# Patient Record
Sex: FEMALE | Race: WHITE | NOT HISPANIC OR LATINO
[De-identification: names, ages, dates, MRNs, and addresses within clinical notes are randomized per-mention and may not be internally consistent; named-entity substitution may affect disease eponyms.]

---

## 2017-02-08 ENCOUNTER — APPOINTMENT (OUTPATIENT)
Dept: OTOLARYNGOLOGY | Facility: CLINIC | Age: 82
End: 2017-02-08

## 2017-04-26 ENCOUNTER — APPOINTMENT (OUTPATIENT)
Dept: OTOLARYNGOLOGY | Facility: CLINIC | Age: 82
End: 2017-04-26

## 2017-04-26 VITALS
HEART RATE: 55 BPM | BODY MASS INDEX: 22.16 KG/M2 | HEIGHT: 65 IN | DIASTOLIC BLOOD PRESSURE: 84 MMHG | SYSTOLIC BLOOD PRESSURE: 157 MMHG | WEIGHT: 133 LBS

## 2017-04-26 RX ORDER — OMEPRAZOLE 40 MG/1
40 CAPSULE, DELAYED RELEASE ORAL
Qty: 90 | Refills: 0 | Status: ACTIVE | COMMUNITY
Start: 2017-02-25

## 2017-04-26 RX ORDER — ATENOLOL 50 MG/1
50 TABLET ORAL
Qty: 90 | Refills: 0 | Status: ACTIVE | COMMUNITY
Start: 2017-01-17

## 2017-04-26 RX ORDER — BUSPIRONE HYDROCHLORIDE 5 MG/1
5 TABLET ORAL
Qty: 30 | Refills: 0 | Status: ACTIVE | COMMUNITY
Start: 2017-03-01

## 2017-04-26 RX ORDER — ATORVASTATIN CALCIUM 10 MG/1
10 TABLET, FILM COATED ORAL
Qty: 90 | Refills: 0 | Status: ACTIVE | COMMUNITY
Start: 2016-05-12

## 2017-04-26 RX ORDER — AMLODIPINE BESYLATE 5 MG/1
5 TABLET ORAL
Qty: 90 | Refills: 0 | Status: ACTIVE | COMMUNITY
Start: 2016-12-07

## 2017-04-26 RX ORDER — LOSARTAN POTASSIUM AND HYDROCHLOROTHIAZIDE 12.5; 5 MG/1; MG/1
50-12.5 TABLET ORAL
Qty: 90 | Refills: 0 | Status: ACTIVE | COMMUNITY
Start: 2016-12-07

## 2017-07-03 ENCOUNTER — TRANSCRIPTION ENCOUNTER (OUTPATIENT)
Age: 82
End: 2017-07-03

## 2017-07-19 ENCOUNTER — APPOINTMENT (OUTPATIENT)
Dept: OTOLARYNGOLOGY | Facility: CLINIC | Age: 82
End: 2017-07-19

## 2017-11-17 ENCOUNTER — APPOINTMENT (OUTPATIENT)
Dept: OTOLARYNGOLOGY | Facility: CLINIC | Age: 82
End: 2017-11-17
Payer: MEDICARE

## 2017-11-17 VITALS
BODY MASS INDEX: 22.2 KG/M2 | SYSTOLIC BLOOD PRESSURE: 157 MMHG | DIASTOLIC BLOOD PRESSURE: 90 MMHG | HEART RATE: 58 BPM | HEIGHT: 64 IN | WEIGHT: 130 LBS

## 2017-11-17 PROCEDURE — 69210 REMOVE IMPACTED EAR WAX UNI: CPT

## 2017-11-17 PROCEDURE — 99213 OFFICE O/P EST LOW 20 MIN: CPT | Mod: 25

## 2017-11-17 RX ORDER — AMOXICILLIN 250 MG/1
250 CAPSULE ORAL
Qty: 14 | Refills: 0 | Status: ACTIVE | COMMUNITY
Start: 2017-11-06

## 2017-11-17 RX ORDER — HYDROCHLOROTHIAZIDE 12.5 MG/1
12.5 CAPSULE ORAL
Qty: 30 | Refills: 0 | Status: ACTIVE | COMMUNITY
Start: 2017-10-25

## 2018-03-19 ENCOUNTER — INPATIENT (INPATIENT)
Facility: HOSPITAL | Age: 83
LOS: 2 days | Discharge: HOME HEALTH | End: 2018-03-22
Attending: STUDENT IN AN ORGANIZED HEALTH CARE EDUCATION/TRAINING PROGRAM | Admitting: STUDENT IN AN ORGANIZED HEALTH CARE EDUCATION/TRAINING PROGRAM
Payer: MEDICARE

## 2018-03-19 VITALS
OXYGEN SATURATION: 98 % | TEMPERATURE: 98 F | HEART RATE: 145 BPM | SYSTOLIC BLOOD PRESSURE: 144 MMHG | RESPIRATION RATE: 20 BRPM | DIASTOLIC BLOOD PRESSURE: 100 MMHG

## 2018-03-19 DIAGNOSIS — S72.001E: Chronic | ICD-10-CM

## 2018-03-19 DIAGNOSIS — I48.91 UNSPECIFIED ATRIAL FIBRILLATION: ICD-10-CM

## 2018-03-19 LAB
ALBUMIN SERPL ELPH-MCNC: 3.7 G/DL — SIGNIFICANT CHANGE UP (ref 3.3–5)
ALP SERPL-CCNC: 84 U/L — SIGNIFICANT CHANGE UP (ref 40–120)
ALT FLD-CCNC: 7 U/L — SIGNIFICANT CHANGE UP (ref 4–33)
APTT BLD: 30.7 SEC — SIGNIFICANT CHANGE UP (ref 27.5–37.4)
AST SERPL-CCNC: 14 U/L — SIGNIFICANT CHANGE UP (ref 4–32)
BASOPHILS # BLD AUTO: 0.03 K/UL — SIGNIFICANT CHANGE UP (ref 0–0.2)
BASOPHILS NFR BLD AUTO: 0.3 % — SIGNIFICANT CHANGE UP (ref 0–2)
BILIRUB SERPL-MCNC: 0.6 MG/DL — SIGNIFICANT CHANGE UP (ref 0.2–1.2)
BUN SERPL-MCNC: 23 MG/DL — SIGNIFICANT CHANGE UP (ref 7–23)
CALCIUM SERPL-MCNC: 9 MG/DL — SIGNIFICANT CHANGE UP (ref 8.4–10.5)
CHLORIDE SERPL-SCNC: 95 MMOL/L — LOW (ref 98–107)
CK MB BLD-MCNC: 2.54 NG/ML — SIGNIFICANT CHANGE UP (ref 1–4.7)
CK MB BLD-MCNC: SIGNIFICANT CHANGE UP (ref 0–2.5)
CK SERPL-CCNC: 43 U/L — SIGNIFICANT CHANGE UP (ref 25–170)
CO2 SERPL-SCNC: 22 MMOL/L — SIGNIFICANT CHANGE UP (ref 22–31)
CREAT SERPL-MCNC: 1.55 MG/DL — HIGH (ref 0.5–1.3)
EOSINOPHIL # BLD AUTO: 0.05 K/UL — SIGNIFICANT CHANGE UP (ref 0–0.5)
EOSINOPHIL NFR BLD AUTO: 0.5 % — SIGNIFICANT CHANGE UP (ref 0–6)
GLUCOSE SERPL-MCNC: 161 MG/DL — HIGH (ref 70–99)
HCT VFR BLD CALC: 36.9 % — SIGNIFICANT CHANGE UP (ref 34.5–45)
HGB BLD-MCNC: 12.4 G/DL — SIGNIFICANT CHANGE UP (ref 11.5–15.5)
IMM GRANULOCYTES # BLD AUTO: 0.05 # — SIGNIFICANT CHANGE UP
IMM GRANULOCYTES NFR BLD AUTO: 0.5 % — SIGNIFICANT CHANGE UP (ref 0–1.5)
INR BLD: 1.21 — HIGH (ref 0.88–1.17)
LYMPHOCYTES # BLD AUTO: 1.48 K/UL — SIGNIFICANT CHANGE UP (ref 1–3.3)
LYMPHOCYTES # BLD AUTO: 14.4 % — SIGNIFICANT CHANGE UP (ref 13–44)
MCHC RBC-ENTMCNC: 30.4 PG — SIGNIFICANT CHANGE UP (ref 27–34)
MCHC RBC-ENTMCNC: 33.6 % — SIGNIFICANT CHANGE UP (ref 32–36)
MCV RBC AUTO: 90.4 FL — SIGNIFICANT CHANGE UP (ref 80–100)
MONOCYTES # BLD AUTO: 0.88 K/UL — SIGNIFICANT CHANGE UP (ref 0–0.9)
MONOCYTES NFR BLD AUTO: 8.6 % — SIGNIFICANT CHANGE UP (ref 2–14)
NEUTROPHILS # BLD AUTO: 7.8 K/UL — HIGH (ref 1.8–7.4)
NEUTROPHILS NFR BLD AUTO: 75.7 % — SIGNIFICANT CHANGE UP (ref 43–77)
NRBC # FLD: 0 — SIGNIFICANT CHANGE UP
PLATELET # BLD AUTO: 338 K/UL — SIGNIFICANT CHANGE UP (ref 150–400)
PMV BLD: 10.1 FL — SIGNIFICANT CHANGE UP (ref 7–13)
POTASSIUM SERPL-MCNC: 4 MMOL/L — SIGNIFICANT CHANGE UP (ref 3.5–5.3)
POTASSIUM SERPL-SCNC: 4 MMOL/L — SIGNIFICANT CHANGE UP (ref 3.5–5.3)
PROT SERPL-MCNC: 7.1 G/DL — SIGNIFICANT CHANGE UP (ref 6–8.3)
PROTHROM AB SERPL-ACNC: 14 SEC — HIGH (ref 9.8–13.1)
RBC # BLD: 4.08 M/UL — SIGNIFICANT CHANGE UP (ref 3.8–5.2)
RBC # FLD: 12.8 % — SIGNIFICANT CHANGE UP (ref 10.3–14.5)
SODIUM SERPL-SCNC: 136 MMOL/L — SIGNIFICANT CHANGE UP (ref 135–145)
TROPONIN T SERPL-MCNC: < 0.06 NG/ML — SIGNIFICANT CHANGE UP (ref 0–0.06)
TSH SERPL-MCNC: 2.73 UIU/ML — SIGNIFICANT CHANGE UP (ref 0.27–4.2)
WBC # BLD: 10.29 K/UL — SIGNIFICANT CHANGE UP (ref 3.8–10.5)
WBC # FLD AUTO: 10.29 K/UL — SIGNIFICANT CHANGE UP (ref 3.8–10.5)

## 2018-03-19 PROCEDURE — 71045 X-RAY EXAM CHEST 1 VIEW: CPT | Mod: 26

## 2018-03-19 RX ORDER — SODIUM CHLORIDE 9 MG/ML
500 INJECTION INTRAMUSCULAR; INTRAVENOUS; SUBCUTANEOUS ONCE
Qty: 0 | Refills: 0 | Status: COMPLETED | OUTPATIENT
Start: 2018-03-19 | End: 2018-03-19

## 2018-03-19 RX ORDER — METOPROLOL TARTRATE 50 MG
50 TABLET ORAL
Qty: 0 | Refills: 0 | Status: DISCONTINUED | OUTPATIENT
Start: 2018-03-19 | End: 2018-03-19

## 2018-03-19 RX ORDER — HEPARIN SODIUM 5000 [USP'U]/ML
4500 INJECTION INTRAVENOUS; SUBCUTANEOUS EVERY 6 HOURS
Qty: 0 | Refills: 0 | Status: DISCONTINUED | OUTPATIENT
Start: 2018-03-19 | End: 2018-03-21

## 2018-03-19 RX ORDER — HEPARIN SODIUM 5000 [USP'U]/ML
INJECTION INTRAVENOUS; SUBCUTANEOUS
Qty: 25000 | Refills: 0 | Status: DISCONTINUED | OUTPATIENT
Start: 2018-03-19 | End: 2018-03-21

## 2018-03-19 RX ORDER — METOPROLOL TARTRATE 50 MG
12.5 TABLET ORAL ONCE
Qty: 0 | Refills: 0 | Status: COMPLETED | OUTPATIENT
Start: 2018-03-19 | End: 2018-03-19

## 2018-03-19 RX ORDER — HEPARIN SODIUM 5000 [USP'U]/ML
4500 INJECTION INTRAVENOUS; SUBCUTANEOUS ONCE
Qty: 0 | Refills: 0 | Status: COMPLETED | OUTPATIENT
Start: 2018-03-19 | End: 2018-03-19

## 2018-03-19 RX ORDER — HEPARIN SODIUM 5000 [USP'U]/ML
2000 INJECTION INTRAVENOUS; SUBCUTANEOUS EVERY 6 HOURS
Qty: 0 | Refills: 0 | Status: DISCONTINUED | OUTPATIENT
Start: 2018-03-19 | End: 2018-03-21

## 2018-03-19 RX ADMIN — SODIUM CHLORIDE 1000 MILLILITER(S): 9 INJECTION INTRAMUSCULAR; INTRAVENOUS; SUBCUTANEOUS at 23:40

## 2018-03-19 RX ADMIN — HEPARIN SODIUM 1100 UNIT(S)/HR: 5000 INJECTION INTRAVENOUS; SUBCUTANEOUS at 23:02

## 2018-03-19 RX ADMIN — Medication 12.5 MILLIGRAM(S): at 23:40

## 2018-03-19 RX ADMIN — HEPARIN SODIUM 4500 UNIT(S): 5000 INJECTION INTRAVENOUS; SUBCUTANEOUS at 23:02

## 2018-03-19 NOTE — ED ADULT NURSE REASSESSMENT NOTE - NS ED NURSE REASSESS COMMENT FT1
MD attending aware of patients blood pressure, 250 ml bolus ordered. pt reports "feeling better", in NAD, will re-assess after bolus.

## 2018-03-19 NOTE — ED PROVIDER NOTE - MEDICAL DECISION MAKING DETAILS
89 F with new onset afib. Ordered standard labs, no signs of ACS or thyroid cause. Will admit for AC and rate control.

## 2018-03-19 NOTE — ED PROVIDER NOTE - ATTENDING CONTRIBUTION TO CARE
nic: one week of shortness of breath on ambulation and weakness. seen today at PCP and found to be in rapid atrial fibrillation and sent to ED.   exam: HR approx 110. NAD, although pt appeared fatigued at rest.  lungs clear. exam otherwise unremarkable.   ekg: JESS rate 145.   labs and cxr pending.  WIll give small dose cardiazem to slow HR. awaiting w/u. will admit to hospital.

## 2018-03-19 NOTE — ED PROVIDER NOTE - PMH
Essential hypertension    Hyperlipidemia    Rib fracture, left, closed, initial encounter  in sept of 2015, recovering.

## 2018-03-19 NOTE — ED PROVIDER NOTE - PSH
Open right hip fracture, type I or II, with routine healing, subsequent encounter  arthroplast in the past

## 2018-03-19 NOTE — ED ADULT TRIAGE NOTE - CHIEF COMPLAINT QUOTE
presented to walk in center feeling light headed,. was found to by hypotensive and in rapid a-fib in the 140s. pt is unsure if she has a hx. pt in NAD at triage denies CP or SOB.

## 2018-03-19 NOTE — ED PROVIDER NOTE - OBJECTIVE STATEMENT
89 yo F PMH of HTN, HLD, GERD presenting with rapid heart rate from PCP. Pt states that this all began about 2 weeks ago when she saw a cardiologist (Dr. Steven Goldberg). She states at that time was found to have a low BP (110/70) and he d/c'd her HCTZ and decreased her dose of atenolol. Pt subsequently began having worsening SOB and KAPLAN. Pt 89 yo F PMH of HTN, HLD, GERD presenting with rapid heart rate from PCP. Pt states that this all began about 2 weeks ago when she saw a cardiologist (Dr. Steven Goldberg). She states at that time was found to have a low BP (110/70) and he d/c'd her HCTZ and decreased her dose of atenolol. Pt subsequently began having worsening SOB and KAPLAN. Pt went in to [facility] and was found to have rapid afib and sent to the ED. Pt does not have a history of Afib per pt and per cardiologist. Pt denies any CP, N/V, diarrhea, recent infection.

## 2018-03-19 NOTE — ED ADULT NURSE NOTE - OBJECTIVE STATEMENT
Pt A+OX3 c/o SOB x2 days.  Went to [facility] today to get checked for SOB and was sent here.  Says her MD recently decreased her BP and water pill.  No pedal edema noted.  EKG done.  CM placed.  Labs obtained and sent as ordered.  PIV placed

## 2018-03-19 NOTE — ED PROVIDER NOTE - PROGRESS NOTE DETAILS
Spoke with on call cardiologist Dr. Kebede who [facility]. Spoke with Dr. Jacob and will be admitted under him

## 2018-03-20 DIAGNOSIS — R93.8 ABNORMAL FINDINGS ON DIAGNOSTIC IMAGING OF OTHER SPECIFIED BODY STRUCTURES: ICD-10-CM

## 2018-03-20 DIAGNOSIS — I48.91 UNSPECIFIED ATRIAL FIBRILLATION: ICD-10-CM

## 2018-03-20 DIAGNOSIS — Z29.9 ENCOUNTER FOR PROPHYLACTIC MEASURES, UNSPECIFIED: ICD-10-CM

## 2018-03-20 DIAGNOSIS — I10 ESSENTIAL (PRIMARY) HYPERTENSION: ICD-10-CM

## 2018-03-20 DIAGNOSIS — E78.5 HYPERLIPIDEMIA, UNSPECIFIED: ICD-10-CM

## 2018-03-20 LAB
APTT BLD: 162.4 SEC — CRITICAL HIGH (ref 27.5–37.4)
APTT BLD: 32 SEC — SIGNIFICANT CHANGE UP (ref 27.5–37.4)
APTT BLD: 68.7 SEC — HIGH (ref 27.5–37.4)
BUN SERPL-MCNC: 24 MG/DL — HIGH (ref 7–23)
CALCIUM SERPL-MCNC: 8.1 MG/DL — LOW (ref 8.4–10.5)
CHLORIDE SERPL-SCNC: 101 MMOL/L — SIGNIFICANT CHANGE UP (ref 98–107)
CHOLEST SERPL-MCNC: 137 MG/DL — SIGNIFICANT CHANGE UP (ref 120–199)
CK MB BLD-MCNC: 3.22 NG/ML — SIGNIFICANT CHANGE UP (ref 1–4.7)
CK SERPL-CCNC: 31 U/L — SIGNIFICANT CHANGE UP (ref 25–170)
CO2 SERPL-SCNC: 25 MMOL/L — SIGNIFICANT CHANGE UP (ref 22–31)
CREAT SERPL-MCNC: 1.36 MG/DL — HIGH (ref 0.5–1.3)
GLUCOSE SERPL-MCNC: 109 MG/DL — HIGH (ref 70–99)
HBA1C BLD-MCNC: 6 % — HIGH (ref 4–5.6)
HCT VFR BLD CALC: 30 % — LOW (ref 34.5–45)
HDLC SERPL-MCNC: 44 MG/DL — LOW (ref 45–65)
HGB BLD-MCNC: 10.2 G/DL — LOW (ref 11.5–15.5)
LIPID PNL WITH DIRECT LDL SERPL: 85 MG/DL — SIGNIFICANT CHANGE UP
MAGNESIUM SERPL-MCNC: 1 MG/DL — CRITICAL LOW (ref 1.6–2.6)
MCHC RBC-ENTMCNC: 30.6 PG — SIGNIFICANT CHANGE UP (ref 27–34)
MCHC RBC-ENTMCNC: 34 % — SIGNIFICANT CHANGE UP (ref 32–36)
MCV RBC AUTO: 90.1 FL — SIGNIFICANT CHANGE UP (ref 80–100)
NRBC # FLD: 0 — SIGNIFICANT CHANGE UP
PLATELET # BLD AUTO: 245 K/UL — SIGNIFICANT CHANGE UP (ref 150–400)
PMV BLD: 10 FL — SIGNIFICANT CHANGE UP (ref 7–13)
POTASSIUM SERPL-MCNC: 3.8 MMOL/L — SIGNIFICANT CHANGE UP (ref 3.5–5.3)
POTASSIUM SERPL-SCNC: 3.8 MMOL/L — SIGNIFICANT CHANGE UP (ref 3.5–5.3)
RBC # BLD: 3.33 M/UL — LOW (ref 3.8–5.2)
RBC # FLD: 12.8 % — SIGNIFICANT CHANGE UP (ref 10.3–14.5)
SODIUM SERPL-SCNC: 138 MMOL/L — SIGNIFICANT CHANGE UP (ref 135–145)
TRIGL SERPL-MCNC: 60 MG/DL — SIGNIFICANT CHANGE UP (ref 10–149)
TROPONIN T SERPL-MCNC: < 0.06 NG/ML — SIGNIFICANT CHANGE UP (ref 0–0.06)
WBC # BLD: 6.43 K/UL — SIGNIFICANT CHANGE UP (ref 3.8–10.5)
WBC # FLD AUTO: 6.43 K/UL — SIGNIFICANT CHANGE UP (ref 3.8–10.5)

## 2018-03-20 RX ORDER — PANTOPRAZOLE SODIUM 20 MG/1
40 TABLET, DELAYED RELEASE ORAL
Qty: 0 | Refills: 0 | Status: DISCONTINUED | OUTPATIENT
Start: 2018-03-20 | End: 2018-03-22

## 2018-03-20 RX ORDER — ATORVASTATIN CALCIUM 80 MG/1
10 TABLET, FILM COATED ORAL AT BEDTIME
Qty: 0 | Refills: 0 | Status: DISCONTINUED | OUTPATIENT
Start: 2018-03-20 | End: 2018-03-22

## 2018-03-20 RX ORDER — MAGNESIUM SULFATE 500 MG/ML
2 VIAL (ML) INJECTION ONCE
Qty: 0 | Refills: 0 | Status: COMPLETED | OUTPATIENT
Start: 2018-03-20 | End: 2018-03-20

## 2018-03-20 RX ORDER — MAGNESIUM OXIDE 400 MG ORAL TABLET 241.3 MG
400 TABLET ORAL
Qty: 0 | Refills: 0 | Status: COMPLETED | OUTPATIENT
Start: 2018-03-20 | End: 2018-03-20

## 2018-03-20 RX ORDER — CHOLECALCIFEROL (VITAMIN D3) 125 MCG
2000 CAPSULE ORAL DAILY
Qty: 0 | Refills: 0 | Status: DISCONTINUED | OUTPATIENT
Start: 2018-03-20 | End: 2018-03-22

## 2018-03-20 RX ORDER — ATENOLOL 25 MG/1
25 TABLET ORAL DAILY
Qty: 0 | Refills: 0 | Status: DISCONTINUED | OUTPATIENT
Start: 2018-03-20 | End: 2018-03-21

## 2018-03-20 RX ORDER — MAGNESIUM SULFATE 500 MG/ML
2 VIAL (ML) INJECTION ONCE
Qty: 0 | Refills: 0 | Status: DISCONTINUED | OUTPATIENT
Start: 2018-03-20 | End: 2018-03-20

## 2018-03-20 RX ADMIN — MAGNESIUM OXIDE 400 MG ORAL TABLET 400 MILLIGRAM(S): 241.3 TABLET ORAL at 08:51

## 2018-03-20 RX ADMIN — ATENOLOL 25 MILLIGRAM(S): 25 TABLET ORAL at 06:21

## 2018-03-20 RX ADMIN — Medication 2000 UNIT(S): at 13:22

## 2018-03-20 RX ADMIN — Medication 50 GRAM(S): at 11:50

## 2018-03-20 RX ADMIN — HEPARIN SODIUM 4500 UNIT(S): 5000 INJECTION INTRAVENOUS; SUBCUTANEOUS at 18:50

## 2018-03-20 RX ADMIN — HEPARIN SODIUM 0 UNIT(S)/HR: 5000 INJECTION INTRAVENOUS; SUBCUTANEOUS at 06:56

## 2018-03-20 RX ADMIN — Medication 50 GRAM(S): at 06:20

## 2018-03-20 RX ADMIN — HEPARIN SODIUM 900 UNIT(S)/HR: 5000 INJECTION INTRAVENOUS; SUBCUTANEOUS at 13:11

## 2018-03-20 RX ADMIN — HEPARIN SODIUM 1100 UNIT(S)/HR: 5000 INJECTION INTRAVENOUS; SUBCUTANEOUS at 18:45

## 2018-03-20 RX ADMIN — MAGNESIUM OXIDE 400 MG ORAL TABLET 400 MILLIGRAM(S): 241.3 TABLET ORAL at 17:43

## 2018-03-20 RX ADMIN — PANTOPRAZOLE SODIUM 40 MILLIGRAM(S): 20 TABLET, DELAYED RELEASE ORAL at 08:50

## 2018-03-20 RX ADMIN — ATORVASTATIN CALCIUM 10 MILLIGRAM(S): 80 TABLET, FILM COATED ORAL at 21:21

## 2018-03-20 RX ADMIN — MAGNESIUM OXIDE 400 MG ORAL TABLET 400 MILLIGRAM(S): 241.3 TABLET ORAL at 13:21

## 2018-03-20 RX ADMIN — HEPARIN SODIUM 900 UNIT(S)/HR: 5000 INJECTION INTRAVENOUS; SUBCUTANEOUS at 07:29

## 2018-03-20 NOTE — PATIENT PROFILE ADULT. - EXTENSIONS OF SELF_ADULT
Cane/Dentures/Eyeglasses/pt wears glasses at home but did not bring to hospital, cane at bedside/Hearing Aid

## 2018-03-20 NOTE — H&P ADULT - PROBLEM SELECTOR PLAN 2
Prelim CXR revealed "Elevated left hemidiaphragm with rightward shift of the heart. Clear lungs" Will wait for official CXR read in am. Patient currently vitally stable   Will monitor for now   Continuous pulse oxygen, and NC at 2L

## 2018-03-20 NOTE — H&P ADULT - NEGATIVE NEUROLOGICAL SYMPTOMS
no headache/no confusion/no focal seizures/no syncope/no difficulty walking/no generalized seizures/no vertigo/no transient paralysis/no weakness/no paresthesias/no tremors/no loss of consciousness/no hemiparesis/no loss of sensation

## 2018-03-20 NOTE — ED ADULT NURSE REASSESSMENT NOTE - NS ED NURSE REASSESS COMMENT FT1
tele pa made aware of labs notification that patients mg level was 1.0. Cynthia Mcgregor called from lab with result. will medicate as per ordered.

## 2018-03-20 NOTE — H&P ADULT - PROBLEM SELECTOR PLAN 1
[id number] Score of 4 and patient started on heparin drip for anticoagulation  Will monitor on telemetry, serial EKG and Britney prn for episodes of palpitations or chest pain  HgbA1C, lipid profile, CBC, CMP in am  TTE ordered to evaluate LVEF in am   Started on Cardizem 30mg q6hrs for rate control with hold parameters   Will continue with Atenolol 25mg QD for rate control with hold parameters

## 2018-03-20 NOTE — H&P ADULT - NEGATIVE ENMT SYMPTOMS
no ear pain/no tinnitus/no vertigo/no sinus symptoms/no nasal discharge/no hearing difficulty/no nasal congestion

## 2018-03-20 NOTE — H&P ADULT - ATTENDING COMMENTS
Chart reviewed. Vitals and Labs noted.   Pt seen and examined at bedside. Plan formulated with the resident/PA/NP. Detail H&P as above.   Feeling anxious, feel well otherwise.   no cp, no sob, no n/v/d. no abdominal pain.  no headache, no dizziness. no recent illness.     Admitted for new onset a.fib. Card eval appreciated.   Cardizem for rate control.  Heparin drip. enzymes neg. TSH WNL.   pending TTE.  aggressively supplement electrolytes. Repeat K and Mg.   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 monitor.     Dr. Steele will be covering me starting 3/21/18. Please paged her at  if needed.     - Dr. RICHI Adams ([facility])  - [phone]

## 2018-03-20 NOTE — PATIENT PROFILE ADULT. - NS TRANSFER PATIENT BELONGINGS
watch on patient, jewelry and cell phone in purse, purse present/Other belongings/Clothing/Cell Phone/PDA (specify)/Wrist Watch/Jewelry/Money (specify)

## 2018-03-20 NOTE — H&P ADULT - NEGATIVE MUSCULOSKELETAL SYMPTOMS
no muscle weakness/no neck pain/no joint swelling/no myalgia/no stiffness/no arthralgia/no arthritis/no muscle cramps

## 2018-03-20 NOTE — PATIENT PROFILE ADULT. - PRO MENTAL HEALTH SX RECENT
poor appetite/feeling depressed/feeling down/bouts of crying/little interest or pleasure in doing things/change in sleep pattern

## 2018-03-20 NOTE — H&P ADULT - ASSESSMENT
89 y/o F with PMH of HLD, HTN presented from PCP office for elevated heart rate.    +New onset Atrial fibrillation with RVR-On Heparin gtt

## 2018-03-20 NOTE — H&P ADULT - RS GEN PE MLT RESP DETAILS PC
clear to auscultation bilaterally/no wheezes/breath sounds equal/no intercostal retractions/no rhonchi/no chest wall tenderness/normal/no rales/good air movement/respirations non-labored/airway patent

## 2018-03-20 NOTE — CONSULT NOTE ADULT - SUBJECTIVE AND OBJECTIVE BOX
CHIEF COMPLAINT: SOB    HISTORY OF PRESENT ILLNESS:  This is a 89 year old woman with HLD and HTN who presented from her physician's office for elevated heart rates. Ms. Meier says she has been feeling SOB with minimal exertion and tired for the past few days. She went to her PCP for these symptoms and was told to stop her HCTZ and cut the atenolol dose in half as her blood pressure was 110/70. This did not relieve her symptoms so she went in to [facility] today and was found to be in rapid atrial fibrillation and was sent to the ED. Patient denied any CP, palpitations, fevers, chills, N/V/D/C, abdominal pain, dysuria, melena, hematochezia, recent travel, sick contact, pleuritic or positional chest pain.     On ED admission EKG revealed Atrial fibrillation with RVR at a rate of 145 with 1mm STD in leads V2-V4 and QTc of 466, CE x 1: Negative, BUN/Cr: 23/1.55, Gluc: 161. Prelim CXR: Elevated left hemidiaphragm with rightward shift of the heart. Clear lungs. Patient received 1x dose of IV Cardizem and IV Metoprolol in the ED. When examined patient is resting in the stretcher and denied any current complaints.    Currently patient is better rate controlled and feels better.       Allergies  No Known Allergies    Advil (Stomach Upset)  	    MEDICATIONS:  ATENolol  Tablet 25 milliGRAM(s) Oral daily  diltiazem    Tablet 30 milliGRAM(s) Oral every 6 hours  heparin  Infusion.  Unit(s)/Hr IV Continuous <Continuous>  heparin  Injectable 4500 Unit(s) IV Push every 6 hours PRN  heparin  Injectable 2000 Unit(s) IV Push every 6 hours PRN  pantoprazole    Tablet 40 milliGRAM(s) Oral before breakfast  atorvastatin 10 milliGRAM(s) Oral at bedtime    cholecalciferol 2000 Unit(s) Oral daily      PAST MEDICAL & SURGICAL HISTORY:  Hyperlipidemia  Rib fracture, left, closed, initial encounter: in sept of 2015, recovering.  Essential hypertension  Open right hip fracture, type I or II, with routine healing, subsequent encounter: arthroplast in the past      FAMILY HISTORY:  HTN      SOCIAL HISTORY:    Non-smoker        REVIEW OF SYSTEMS:  See HPI, otherwise complete 10 point review of systems negative      PHYSICAL EXAM:  T(C): 36.6 (03-19-18 @ 19:41), Max: 36.6 (03-19-18 @ 19:41)  HR: 66 (03-20-18 @ 07:22) (66 - 154)  BP: 136/70 (03-20-18 @ 07:22) (80/58 - 187/89)  RR: 20 (03-20-18 @ 07:22) (20 - 20)  SpO2: 98% (03-20-18 @ 07:22) (96% - 99%)  Wt(kg): --  I&O's Summary      Appearance: No Acute Distress	  HEENT:  Normal oral mucosa, PERRL, EOMI	  Cardiovascular: Normal S1 S2, No JVD, No murmurs/rubs/gallops  Respiratory: Lungs clear to auscultation bilaterally  Gastrointestinal:  Soft, Non-tender, + BS	  Skin: No rashes, No ecchymoses, No cyanosis	  Neurologic: Non-focal  Extremities: No clubbing, cyanosis or edema  Vascular: Peripheral pulses palpable 2+ bilaterally  Psychiatry: A & O x 3, Mood & affect appropriate    Laboratory Data:	 	    CBC Full  -  ( 20 Mar 2018 04:30 )  WBC Count : 6.43 K/uL  Hemoglobin : 10.2 g/dL  Hematocrit : 30.0 %  Platelet Count - Automated : 245 K/uL  Mean Cell Volume : 90.1 fL  Mean Cell Hemoglobin : 30.6 pg  Mean Cell Hemoglobin Concentration : 34.0 %  Auto Neutrophil # : x  Auto Lymphocyte # : x  Auto Monocyte # : x  Auto Eosinophil # : x  Auto Basophil # : x  Auto Neutrophil % : x  Auto Lymphocyte % : x  Auto Monocyte % : x  Auto Eosinophil % : x  Auto Basophil % : x    03-20    138  |  101  |  24<H>  ----------------------------<  109<H>  3.8   |  25  |  1.36<H>  03-19    136  |  95<L>  |  23  ----------------------------<  161<H>  4.0   |  22  |  1.55<H>    Ca    8.1<L>      20 Mar 2018 04:30  Ca    9.0      19 Mar 2018 20:15  Mg     1.0     03-20    TPro  7.1  /  Alb  3.7  /  TBili  0.6  /  DBili  x   /  AST  14  /  ALT  7   /  AlkPhos  84  03-19      proBNP:   Lipid Profile:   HgA1c: Hemoglobin A1C, Whole Blood: 6.0 % (03-20 @ 04:30)    TSH: Thyroid Stimulating Hormone, Serum: 2.73 uIU/mL (03-19 @ 20:30)        CARDIAC MARKERS:      CKMB: 3.22 ng/mL (03-20 @ 04:30)  CKMB: 2.54 ng/mL (03-19 @ 20:15)    CKMB Relative Index: Test not performed (03-19 @ 20:15)      Interpretation of Telemetry: A-fib 70s	    ECG: A-fib at 120 bpm; LVH; PVD 	      Assessment: 89 year old woman with HTN and HLD presents with respiratory distress and palpitations found to have a-fib with RVR    Plan of Care:    #Paroxysmal a-fib with RVR-   Rates now better controlled after IV cardizem given in the ER.  Continue PO cardizem for goal heart rate < 110 bpm.  Obtain TTE to assess for structural heart disease.  CHADSII-Vasc = 4. Heparin gtt for AC.  Oral anticoagulation options will be dependent on TTE findings.     #HTN- BP labile in the ER.  Continue to monitor.    Care discussed at length with patient in the ED.      90 minutes spent on total encounter; more than 50% of the visit was spent counseling and/or coordinating care by the attending physician.   	  Quang Ng MD [facility]  Cardiovascular Diseases  [phone]

## 2018-03-20 NOTE — H&P ADULT - NSHPLABSRESULTS_GEN_ALL_CORE
12.4   10.29 )-----------( 338      ( 19 Mar 2018 20:15 )             36.9     03-19    136  |  95<L>  |  23  ----------------------------<  161<H>  4.0   |  22  |  1.55<H>    Ca    9.0      19 Mar 2018 20:15    TPro  7.1  /  Alb  3.7  /  TBili  0.6  /  DBili  x   /  AST  14  /  ALT  7   /  AlkPhos  84  03-19    CARDIAC MARKERS ( 19 Mar 2018 20:15 )  x     / < 0.06 ng/mL / 43 u/L / 2.54 ng/mL / x        Prelim CXR: Elevated left hemidiaphragm with rightward shift of the heart. clear lungs    EKG: Atrial fibrillation with RVR at a rate of 145 with 1mm STD in leads V2-V4 and QTc of 466

## 2018-03-20 NOTE — ED ADULT NURSE REASSESSMENT NOTE - NS ED NURSE REASSESS COMMENT FT1
According to heparin nomogram- heparin ordered to halt for 1 hour- then dose lowered to 9ml/hr. will continue to monitor.

## 2018-03-20 NOTE — H&P ADULT - NEGATIVE OPHTHALMOLOGIC SYMPTOMS
no discharge L/no photophobia/no lacrimation L/no lacrimation R/no blurred vision L/no blurred vision R/no discharge R/no diplopia

## 2018-03-20 NOTE — PATIENT PROFILE ADULT. - VISION (WITH CORRECTIVE LENSES IF THE PATIENT USUALLY WEARS THEM):
patient does not have glasses with her in hospital/Normal vision: sees adequately in most situations; can see medication labels, newsprint

## 2018-03-20 NOTE — H&P ADULT - GASTROINTESTINAL DETAILS
no rebound tenderness/no rigidity/no guarding/normal/nontender/bowel sounds normal/soft/no distention

## 2018-03-20 NOTE — H&P ADULT - HISTORY OF PRESENT ILLNESS
89 y/o F with PMH of HLD, HTN, GERD presented from PCP office for elevated heart rate. As per the patient she has been feeling SOB with minimal exertion and tired for the past few days. Patient had gone to her PMD 2 weeks ago due to these symptoms and was told to stop her HCTZ and cut the atenolol dose in half as her blood pressure was 110/70. Patient did that but she still did not feel well and any minimal exertion she would have to stop and catch her breath. Patient went in to [facility] today and was found to be in rapid atrial fibrillation and was sent to the ED. Patient denied any CP, palpitations, fevers, chills, N/V/D/C, abdominal pain, dysuria, melena, hematochezia, recent travel, sick contact, pleuritic or positional chest pain.     On ED admission EKG revealed Atrial fibrillation with RVR at a rate of 145 with 1mm STD in leads V2-V4 and QTc of 466, CE x 1: Negative, BUN/Cr: 23/1.55, Gluc: 161. Prelim CXR: Elevated left hemidiaphragm with rightward shift of the heart. Clear lungs. Patient received 1x dose of IV Cardizem and IV Metoprolol in the ED. When examined patient is resting in the stretcher and denied any current complaints.

## 2018-03-20 NOTE — H&P ADULT - NEGATIVE GASTROINTESTINAL SYMPTOMS
no constipation/no abdominal pain/no melena/no nausea/no hematochezia/no diarrhea/no change in bowel habits/no vomiting

## 2018-03-21 ENCOUNTER — TRANSCRIPTION ENCOUNTER (OUTPATIENT)
Age: 83
End: 2018-03-21

## 2018-03-21 LAB
APTT BLD: 146.5 SEC — CRITICAL HIGH (ref 27.5–37.4)
APTT BLD: 51.9 SEC — HIGH (ref 27.5–37.4)
APTT BLD: 59.3 SEC — HIGH (ref 27.5–37.4)
APTT BLD: 63.1 SEC — HIGH (ref 27.5–37.4)
BUN SERPL-MCNC: 23 MG/DL — SIGNIFICANT CHANGE UP (ref 7–23)
CALCIUM SERPL-MCNC: 9 MG/DL — SIGNIFICANT CHANGE UP (ref 8.4–10.5)
CHLORIDE SERPL-SCNC: 94 MMOL/L — LOW (ref 98–107)
CO2 SERPL-SCNC: 28 MMOL/L — SIGNIFICANT CHANGE UP (ref 22–31)
CREAT SERPL-MCNC: 1.18 MG/DL — SIGNIFICANT CHANGE UP (ref 0.5–1.3)
GLUCOSE SERPL-MCNC: 129 MG/DL — HIGH (ref 70–99)
HCT VFR BLD CALC: 32.6 % — LOW (ref 34.5–45)
HGB BLD-MCNC: 11 G/DL — LOW (ref 11.5–15.5)
MCHC RBC-ENTMCNC: 30.6 PG — SIGNIFICANT CHANGE UP (ref 27–34)
MCHC RBC-ENTMCNC: 33.7 % — SIGNIFICANT CHANGE UP (ref 32–36)
MCV RBC AUTO: 90.6 FL — SIGNIFICANT CHANGE UP (ref 80–100)
NRBC # FLD: 0 — SIGNIFICANT CHANGE UP
PLATELET # BLD AUTO: 320 K/UL — SIGNIFICANT CHANGE UP (ref 150–400)
PMV BLD: 10 FL — SIGNIFICANT CHANGE UP (ref 7–13)
POTASSIUM SERPL-MCNC: 4.2 MMOL/L — SIGNIFICANT CHANGE UP (ref 3.5–5.3)
POTASSIUM SERPL-SCNC: 4.2 MMOL/L — SIGNIFICANT CHANGE UP (ref 3.5–5.3)
RBC # BLD: 3.6 M/UL — LOW (ref 3.8–5.2)
RBC # FLD: 12.8 % — SIGNIFICANT CHANGE UP (ref 10.3–14.5)
SODIUM SERPL-SCNC: 133 MMOL/L — LOW (ref 135–145)
WBC # BLD: 5.8 K/UL — SIGNIFICANT CHANGE UP (ref 3.8–10.5)
WBC # FLD AUTO: 5.8 K/UL — SIGNIFICANT CHANGE UP (ref 3.8–10.5)

## 2018-03-21 PROCEDURE — 93306 TTE W/DOPPLER COMPLETE: CPT | Mod: 26

## 2018-03-21 RX ORDER — HEPARIN SODIUM 5000 [USP'U]/ML
1000 INJECTION INTRAVENOUS; SUBCUTANEOUS
Qty: 25000 | Refills: 0 | Status: DISCONTINUED | OUTPATIENT
Start: 2018-03-21 | End: 2018-03-22

## 2018-03-21 RX ORDER — APIXABAN 2.5 MG/1
1 TABLET, FILM COATED ORAL
Qty: 60 | Refills: 0 | OUTPATIENT
Start: 2018-03-21 | End: 2018-04-19

## 2018-03-21 RX ORDER — APIXABAN 2.5 MG/1
1 TABLET, FILM COATED ORAL
Qty: 60 | Refills: 0
Start: 2018-03-21 | End: 2018-04-19

## 2018-03-21 RX ORDER — HEPARIN SODIUM 5000 [USP'U]/ML
4500 INJECTION INTRAVENOUS; SUBCUTANEOUS EVERY 6 HOURS
Qty: 0 | Refills: 0 | Status: DISCONTINUED | OUTPATIENT
Start: 2018-03-21 | End: 2018-03-22

## 2018-03-21 RX ORDER — METOPROLOL TARTRATE 50 MG
25 TABLET ORAL
Qty: 0 | Refills: 0 | Status: DISCONTINUED | OUTPATIENT
Start: 2018-03-21 | End: 2018-03-22

## 2018-03-21 RX ORDER — METOPROLOL TARTRATE 50 MG
1 TABLET ORAL
Qty: 60 | Refills: 0
Start: 2018-03-21 | End: 2018-04-19

## 2018-03-21 RX ORDER — HEPARIN SODIUM 5000 [USP'U]/ML
2000 INJECTION INTRAVENOUS; SUBCUTANEOUS EVERY 6 HOURS
Qty: 0 | Refills: 0 | Status: DISCONTINUED | OUTPATIENT
Start: 2018-03-21 | End: 2018-03-22

## 2018-03-21 RX ORDER — ATENOLOL 25 MG/1
1 TABLET ORAL
Qty: 0 | Refills: 0 | COMMUNITY

## 2018-03-21 RX ORDER — POLYETHYLENE GLYCOL 3350 17 G/17G
17 POWDER, FOR SOLUTION ORAL
Qty: 0 | Refills: 0 | DISCHARGE
Start: 2018-03-21

## 2018-03-21 RX ORDER — SENNA PLUS 8.6 MG/1
2 TABLET ORAL
Qty: 0 | Refills: 0 | DISCHARGE
Start: 2018-03-21

## 2018-03-21 RX ORDER — POLYETHYLENE GLYCOL 3350 17 G/17G
17 POWDER, FOR SOLUTION ORAL DAILY
Qty: 0 | Refills: 0 | Status: DISCONTINUED | OUTPATIENT
Start: 2018-03-21 | End: 2018-03-22

## 2018-03-21 RX ORDER — SENNA PLUS 8.6 MG/1
2 TABLET ORAL AT BEDTIME
Qty: 0 | Refills: 0 | Status: DISCONTINUED | OUTPATIENT
Start: 2018-03-21 | End: 2018-03-22

## 2018-03-21 RX ADMIN — HEPARIN SODIUM 900 UNIT(S)/HR: 5000 INJECTION INTRAVENOUS; SUBCUTANEOUS at 09:16

## 2018-03-21 RX ADMIN — HEPARIN SODIUM 1000 UNIT(S)/HR: 5000 INJECTION INTRAVENOUS; SUBCUTANEOUS at 17:59

## 2018-03-21 RX ADMIN — HEPARIN SODIUM 1000 UNIT(S)/HR: 5000 INJECTION INTRAVENOUS; SUBCUTANEOUS at 16:39

## 2018-03-21 RX ADMIN — POLYETHYLENE GLYCOL 3350 17 GRAM(S): 17 POWDER, FOR SOLUTION ORAL at 11:39

## 2018-03-21 RX ADMIN — ATENOLOL 25 MILLIGRAM(S): 25 TABLET ORAL at 05:27

## 2018-03-21 RX ADMIN — HEPARIN SODIUM 2000 UNIT(S): 5000 INJECTION INTRAVENOUS; SUBCUTANEOUS at 16:40

## 2018-03-21 RX ADMIN — Medication 2000 UNIT(S): at 11:38

## 2018-03-21 RX ADMIN — ATORVASTATIN CALCIUM 10 MILLIGRAM(S): 80 TABLET, FILM COATED ORAL at 21:50

## 2018-03-21 RX ADMIN — Medication 25 MILLIGRAM(S): at 17:58

## 2018-03-21 RX ADMIN — HEPARIN SODIUM 0 UNIT(S)/HR: 5000 INJECTION INTRAVENOUS; SUBCUTANEOUS at 01:34

## 2018-03-21 RX ADMIN — HEPARIN SODIUM 900 UNIT(S)/HR: 5000 INJECTION INTRAVENOUS; SUBCUTANEOUS at 02:38

## 2018-03-21 RX ADMIN — PANTOPRAZOLE SODIUM 40 MILLIGRAM(S): 20 TABLET, DELAYED RELEASE ORAL at 05:27

## 2018-03-21 NOTE — DISCHARGE NOTE ADULT - PLAN OF CARE
Low sodium and fat diet, you were resumed on HCTZ follow up with primary care physician. To maintain normal cholesterol levels to prevent stroke, coronary artery disease, peripheral vascular disease and heart attacks. Low fat diet, exercise daily and continue current medications. Follow up with primary care physician and cardiologist for management. To restore or maintain a normal heart rate and rhythm, to prevent blood clots, and decrease the risks of stroke CVA/TIA. Please take your medications as prescribed.  Continue to take your blood thinner as prescribed and follow with your cardiologist in 1-2 weeks.  Low fat diet, reduce caffeine intake, and exercise at least 30 minutes daily. To maintain a normal blood pressure to prevent heart attack, stroke and renal failure.

## 2018-03-21 NOTE — DISCHARGE NOTE ADULT - CARE PROVIDER_API CALL
Dr Kam,   PCP  Phone: (   )    -  Fax: (   )    -    Dr Steven Goldberg,   cards  Phone: (   )    -  Fax: (   )    -

## 2018-03-21 NOTE — DISCHARGE NOTE ADULT - MEDICATION SUMMARY - MEDICATIONS TO STOP TAKING
I will STOP taking the medications listed below when I get home from the hospital:    Percocet 5/325  --   4 times a day    amLODIPine 5 mg oral tablet  -- 1 tab(s) by mouth once a day  -- It is very important that you take or use this exactly as directed.  Do not skip doses or discontinue unless directed by your doctor.  Some non-prescription drugs may aggravate your condition.  Read all labels carefully.  If a warning appears, check with your doctor before taking.    atenolol 25 mg oral tablet  -- 1 tab(s) by mouth once a day

## 2018-03-21 NOTE — DISCHARGE NOTE ADULT - CARE PLAN
Principal Discharge DX:	New onset atrial fibrillation  Goal:	To restore or maintain a normal heart rate and rhythm, to prevent blood clots, and decrease the risks of stroke CVA/TIA.  Assessment and plan of treatment:	Please take your medications as prescribed.  Continue to take your blood thinner as prescribed and follow with your cardiologist in 1-2 weeks.  Low fat diet, reduce caffeine intake, and exercise at least 30 minutes daily.  Secondary Diagnosis:	Essential hypertension  Goal:	To maintain a normal blood pressure to prevent heart attack, stroke and renal failure.  Assessment and plan of treatment:	Low sodium and fat diet, you were resumed on HCTZ follow up with primary care physician.  Secondary Diagnosis:	Hyperlipidemia  Goal:	To maintain normal cholesterol levels to prevent stroke, coronary artery disease, peripheral vascular disease and heart attacks.  Assessment and plan of treatment:	Low fat diet, exercise daily and continue current medications. Follow up with primary care physician and cardiologist for management.

## 2018-03-21 NOTE — PROGRESS NOTE ADULT - PROBLEM SELECTOR PLAN 1
[id number] Score of 4 and patient started on heparin drip for anticoagulation  Will monitor on telemetry, serial EKG   TTE ordered to evaluate LVEF   Started on Cardizem 30mg q6hrs for rate control with hold parameters   Will continue with Atenolol 25mg QD for rate control with hold parameters  f/u cards recs

## 2018-03-21 NOTE — DISCHARGE NOTE ADULT - PROVIDER TOKENS
FREE:[LAST:[Dr Kam],PHONE:[(   )    -],FAX:[(   )    -],ADDRESS:[PCP]],FREE:[LAST:[Dr Steven Goldberg],PHONE:[(   )    -],FAX:[(   )    -],ADDRESS:[cards]]

## 2018-03-21 NOTE — PROGRESS NOTE ADULT - ASSESSMENT
89 y/o F with PMH of HLD, HTN presented from PCP office for elevated heart rate.    +New onset Atrial fibrillation with RVR-On Heparin gtt

## 2018-03-21 NOTE — DISCHARGE NOTE ADULT - MEDICATION SUMMARY - MEDICATIONS TO TAKE
I will START or STAY ON the medications listed below when I get home from the hospital:    Eliquis 2.5 mg oral tablet  -- 1 tab(s) by mouth 2 times a day   -- Check with your doctor before becoming pregnant.  It is very important that you take or use this exactly as directed.  Do not skip doses or discontinue unless directed by your doctor.  Obtain medical advice before taking any non-prescription drugs as some may affect the action of this medication.    -- Indication: For Afib    Lipitor 10 mg oral tablet  -- 1 tab(s) by mouth once a day  -- Indication: For Hld    metoprolol tartrate 25 mg oral tablet  -- 1 tab(s) by mouth 2 times a day  -- Indication: For Afib    hydroCHLOROthiazide 12.5 mg oral capsule  -- 1 cap(s) by mouth once a day  -- Indication: For Htn    senna oral tablet  -- 2 tab(s) by mouth once a day (at bedtime)  -- Indication: For constipation    polyethylene glycol 3350 oral powder for reconstitution  -- 17 gram(s) by mouth once a day  -- Indication: For constipation    omeprazole 40 mg oral delayed release capsule  -- 1 cap(s) by mouth once a day  -- Indication: For gerd    Vitamin D3 2000 intl units oral tablet  -- 1 tab(s) by mouth once a day  -- Indication: For supplement

## 2018-03-21 NOTE — DISCHARGE NOTE ADULT - PATIENT PORTAL LINK FT
You can access the [facility] Patient Portal, offered by [facility], by registering with the following website: [email or web address]

## 2018-03-21 NOTE — DISCHARGE NOTE ADULT - HOSPITAL COURSE
89 y/o F with PMH of HLD, HTN, GERD presented from PCP office for elevated heart rate. As per the patient she has been feeling SOB with minimal exertion and tired for the past few days. Patient had gone to her PMD 2 weeks ago due to these symptoms and was told to stop her HCTZ and cut the atenolol dose in half as her blood pressure was 110/70. Patient did that but she still did not feel well and any minimal exertion she would have to stop and catch her breath. Patient went in to [facility] today and was found to be in rapid atrial fibrillation and was sent to the ED. Patient denied any CP, palpitations, fevers, chills, N/V/D/C, abdominal pain, dysuria, melena, hematochezia, recent travel, sick contact, pleuritic or positional chest pain.     Hospital course:  EKG: Atrial fibrillation with RVR at a rate of 145 with 1mm STD in leads V2-V4 and QTc of 466  CE x 2: Negative   BUN/Cr: 23/1.55  Gluc: 161  Prelim CXR: Elevated left hemidiaphragm with rightward shift of the heart. Clear lungs.  Magnesium 1.0-->supplementing 89 y/o F with PMH of HLD, HTN, GERD presented from PCP office for elevated heart rate. As per the patient she has been feeling SOB with minimal exertion and tired for the past few days. Patient had gone to her PMD 2 weeks ago due to these symptoms and was told to stop her HCTZ and cut the atenolol dose in half as her blood pressure was 110/70. Patient did that but she still did not feel well and any minimal exertion she would have to stop and catch her breath. Patient went in to [facility] today and was found to be in rapid atrial fibrillation and was sent to the ED. Patient denied any CP, palpitations, fevers, chills, N/V/D/C, abdominal pain, dysuria, melena, hematochezia, recent travel, sick contact, pleuritic or positional chest pain.     Hospital course:  EKG: Atrial fibrillation with RVR at a rate of 145 with 1mm STD in leads V2-V4 and QTc of 466  CE x 2: Negative   BUN/Cr: 23/1.55  Gluc: 161  Prelim CXR: Elevated left hemidiaphragm with rightward shift of the heart. Clear lungs.  Magnesium 1.0-->supplemented  TTE: Mitral annular calcification, otherwise normal mitral valve. Minimal mitral regurgitation. Endocardium not well visualized; grossly normal left ventricular systolic function. The right ventricle is not well visualized; grossly normal right ventricular systolic function. Estimated right ventricular systolic pressure equals 48mm Hg, assuming right atrial pressure equals 10 mm Hg, consistent with mild pulmonary hypertension.    CE negative pt ruled out for ACS. Pt noted with afib converted to SR. Pt with high CHADS score was placed on hep gtt. TTE with no valvualr disease, hep transitioned to eliquis. Pt seen by PT, no needs. 89 y/o F with PMH of HLD, HTN, GERD presented from PCP office for elevated heart rate. As per the patient she has been feeling SOB with minimal exertion and tired for the past few days. Patient had gone to her PMD 2 weeks ago due to these symptoms and was told to stop her HCTZ and cut the atenolol dose in half as her blood pressure was 110/70. Patient did that but she still did not feel well and any minimal exertion she would have to stop and catch her breath. Patient went in to [facility] today and was found to be in rapid atrial fibrillation and was sent to the ED. Patient denied any CP, palpitations, fevers, chills, N/V/D/C, abdominal pain, dysuria, melena, hematochezia, recent travel, sick contact, pleuritic or positional chest pain.     Hospital course:  EKG: Atrial fibrillation with RVR at a rate of 145 with 1mm STD in leads V2-V4 and QTc of 466  CE x 2: Negative   BUN/Cr: 23/1.55  Gluc: 161  Prelim CXR: Elevated left hemidiaphragm with rightward shift of the heart. Clear lungs.  Magnesium 1.0-->supplemented  TTE: Mitral annular calcification, otherwise normal mitral valve. Minimal mitral regurgitation. Endocardium not well visualized; grossly normal left ventricular systolic function. The right ventricle is not well visualized; grossly normal right ventricular systolic function. Estimated right ventricular systolic pressure equals 48mm Hg, assuming right atrial pressure equals 10 mm Hg, consistent with mild pulmonary hypertension.    CE negative pt ruled out for ACS. Pt noted with afib converted to SR. Pt with high CHADS score was placed on hep gtt. TTE with no valvualr disease, hep transitioned to eliquis. Pt seen by PT, no needs. Per Dr Steele pt cleared for discharge on 3/22

## 2018-03-21 NOTE — DISCHARGE NOTE ADULT - VISION (WITH CORRECTIVE LENSES IF THE PATIENT USUALLY WEARS THEM):
Normal vision: sees adequately in most situations; can see medication labels, newsprint/patient does not have glasses with her in hospital

## 2018-03-22 VITALS — DIASTOLIC BLOOD PRESSURE: 96 MMHG | SYSTOLIC BLOOD PRESSURE: 150 MMHG | HEART RATE: 76 BPM

## 2018-03-22 LAB
APTT BLD: 81.1 SEC — HIGH (ref 27.5–37.4)
BUN SERPL-MCNC: 19 MG/DL — SIGNIFICANT CHANGE UP (ref 7–23)
CALCIUM SERPL-MCNC: 9 MG/DL — SIGNIFICANT CHANGE UP (ref 8.4–10.5)
CHLORIDE SERPL-SCNC: 92 MMOL/L — LOW (ref 98–107)
CO2 SERPL-SCNC: 25 MMOL/L — SIGNIFICANT CHANGE UP (ref 22–31)
CREAT SERPL-MCNC: 0.97 MG/DL — SIGNIFICANT CHANGE UP (ref 0.5–1.3)
GLUCOSE SERPL-MCNC: 100 MG/DL — HIGH (ref 70–99)
HCT VFR BLD CALC: 32.6 % — LOW (ref 34.5–45)
HGB BLD-MCNC: 10.9 G/DL — LOW (ref 11.5–15.5)
MAGNESIUM SERPL-MCNC: 1.4 MG/DL — LOW (ref 1.6–2.6)
MCHC RBC-ENTMCNC: 29.6 PG — SIGNIFICANT CHANGE UP (ref 27–34)
MCHC RBC-ENTMCNC: 33.4 % — SIGNIFICANT CHANGE UP (ref 32–36)
MCV RBC AUTO: 88.6 FL — SIGNIFICANT CHANGE UP (ref 80–100)
NRBC # FLD: 0 — SIGNIFICANT CHANGE UP
PLATELET # BLD AUTO: 292 K/UL — SIGNIFICANT CHANGE UP (ref 150–400)
PMV BLD: 9.9 FL — SIGNIFICANT CHANGE UP (ref 7–13)
POTASSIUM SERPL-MCNC: 4 MMOL/L — SIGNIFICANT CHANGE UP (ref 3.5–5.3)
POTASSIUM SERPL-SCNC: 4 MMOL/L — SIGNIFICANT CHANGE UP (ref 3.5–5.3)
RBC # BLD: 3.68 M/UL — LOW (ref 3.8–5.2)
RBC # FLD: 12.6 % — SIGNIFICANT CHANGE UP (ref 10.3–14.5)
SODIUM SERPL-SCNC: 131 MMOL/L — LOW (ref 135–145)
WBC # BLD: 5.54 K/UL — SIGNIFICANT CHANGE UP (ref 3.8–10.5)
WBC # FLD AUTO: 5.54 K/UL — SIGNIFICANT CHANGE UP (ref 3.8–10.5)

## 2018-03-22 RX ORDER — MAGNESIUM SULFATE 500 MG/ML
1 VIAL (ML) INJECTION ONCE
Qty: 0 | Refills: 0 | Status: COMPLETED | OUTPATIENT
Start: 2018-03-22 | End: 2018-03-22

## 2018-03-22 RX ORDER — HYDROCHLOROTHIAZIDE 25 MG
12.5 TABLET ORAL DAILY
Qty: 0 | Refills: 0 | Status: DISCONTINUED | OUTPATIENT
Start: 2018-03-22 | End: 2018-03-22

## 2018-03-22 RX ORDER — APIXABAN 2.5 MG/1
2.5 TABLET, FILM COATED ORAL ONCE
Qty: 0 | Refills: 0 | Status: COMPLETED | OUTPATIENT
Start: 2018-03-22 | End: 2018-03-22

## 2018-03-22 RX ORDER — APIXABAN 2.5 MG/1
2.5 TABLET, FILM COATED ORAL EVERY 12 HOURS
Qty: 0 | Refills: 0 | Status: DISCONTINUED | OUTPATIENT
Start: 2018-03-22 | End: 2018-03-22

## 2018-03-22 RX ADMIN — Medication 25 MILLIGRAM(S): at 06:34

## 2018-03-22 RX ADMIN — Medication 100 GRAM(S): at 08:28

## 2018-03-22 RX ADMIN — APIXABAN 2.5 MILLIGRAM(S): 2.5 TABLET, FILM COATED ORAL at 08:44

## 2018-03-22 RX ADMIN — Medication 12.5 MILLIGRAM(S): at 11:17

## 2018-03-22 RX ADMIN — PANTOPRAZOLE SODIUM 40 MILLIGRAM(S): 20 TABLET, DELAYED RELEASE ORAL at 06:34

## 2018-03-22 RX ADMIN — Medication 2000 UNIT(S): at 12:07

## 2018-03-22 RX ADMIN — HEPARIN SODIUM 1000 UNIT(S)/HR: 5000 INJECTION INTRAVENOUS; SUBCUTANEOUS at 00:11

## 2018-03-22 NOTE — PROGRESS NOTE ADULT - SUBJECTIVE AND OBJECTIVE BOX
Cardiovascular Disease Progress Note    Overnight events: No acute events overnight. Ms. Meier denies chest pain or SOB.     Otherwise review of systems negative    Objective Findings:  T(C): 36.3 (18 @ 06:28), Max: 36.5 (18 @ 21:47)  HR: 64 (18 @ 08:25) (63 - 76)  BP: 167/91 (18 @ 08:25) (150/92 - 173/103)  RR: 16 (18 @ 08:25) (16 - 18)  SpO2: 98% (18 @ 08:25) (97% - 98%)  Wt(kg): --  Daily     Daily Weight in k.7 (22 Mar 2018 07:04)      Physical Exam:  Gen: NAD  HEENT: EOMI  CV: RRR, normal S1 + S2, no m/r/g  Lungs: CTAB  Abd: soft, non-tender  Ext: No edema    Telemetry: Sinus; brief PAT.     Laboratory Data:                        10.9   5.54  )-----------( 292      ( 22 Mar 2018 06:00 )             32.6         131<L>  |  92<L>  |  19  ----------------------------<  100<H>  4.0   |  25  |  0.97    Ca    9.0      22 Mar 2018 06:00  Mg     1.4     -      PTT - ( 22 Mar 2018 06:00 )  PTT:81.1 SEC          Inpatient Medications:  MEDICATIONS  (STANDING):  apixaban 2.5 milliGRAM(s) Oral every 12 hours  atorvastatin 10 milliGRAM(s) Oral at bedtime  cholecalciferol 2000 Unit(s) Oral daily  metoprolol tartrate 25 milliGRAM(s) Oral two times a day  pantoprazole    Tablet 40 milliGRAM(s) Oral before breakfast  polyethylene glycol 3350 17 Gram(s) Oral daily  senna 2 Tablet(s) Oral at bedtime      Assessment: 89 year old woman with HTN and HLD presents with respiratory distress and palpitations found to have a-fib with RVR    Plan of Care:    #Paroxysmal a-fib with RVR-   Patient spontaneously converted to sinus rhythm.  Continue Lopressor 25 mg BID.   TTE reviewed- no significant structural heart disease.  CHADSII-Vasc = 4.   Reduced dose Eliquis for AC (advanced age and low body weight).     #HTN- BP labile.  Resume home dose hydrochlorothiazide 12.5 mg daily.    Care discussed with Tele PA.     Over 35 minutes spent on total encounter; more than 50% of the visit was spent counseling and/or coordinating care by the attending physician.      Quang Ng MD [facility]  Cardiovascular Disease  [phone]
Cardiovascular Disease Progress Note    Overnight events: No acute events overnight. Ms. Meier feels well. No chest pain or SOB.    Otherwise review of systems negative    Objective Findings:  T(C): 36.2 (18 @ 05:25), Max: 36.7 (18 @ 13:47)  HR: 75 (18 @ 05:25) (71 - 110)  BP: 150/78 (18 @ 05:25) (130/77 - 150/78)  RR: 18 (18 @ 05:25) (18 - 18)  SpO2: 96% (18 @ 05:25) (95% - 98%)  Wt(kg): --  Daily     Daily Weight in k (21 Mar 2018 07:27)      Physical Exam:  Gen: NAD  HEENT: EOMI  CV: RRR, normal S1 + S2, no m/r/g  Lungs: CTAB  Abd: soft, non-tender  Ext: No edema    Telemetry: Sinus with frequent APDs.     Laboratory Data:                        11.0   5.80  )-----------( 320      ( 21 Mar 2018 06:50 )             32.6     03-20    138  |  101  |  24<H>  ----------------------------<  109<H>  3.8   |  25  |  1.36<H>    Ca    8.1<L>      20 Mar 2018 04:30  Mg     1.0     -20    TPro  7.1  /  Alb  3.7  /  TBili  0.6  /  DBili  x   /  AST  14  /  ALT  7   /  AlkPhos  84  03-19    PT/INR - ( 19 Mar 2018 20:15 )   PT: 14.0 SEC;   INR: 1.21          PTT - ( 21 Mar 2018 08:30 )  PTT:63.1 SEC  CARDIAC MARKERS ( 20 Mar 2018 04:30 )  x     / < 0.06 ng/mL / 31 u/L / 3.22 ng/mL / x      CARDIAC MARKERS ( 19 Mar 2018 20:15 )  x     / < 0.06 ng/mL / 43 u/L / 2.54 ng/mL / x              Inpatient Medications:  MEDICATIONS  (STANDING):  ATENolol  Tablet 25 milliGRAM(s) Oral daily  atorvastatin 10 milliGRAM(s) Oral at bedtime  cholecalciferol 2000 Unit(s) Oral daily  diltiazem    Tablet 30 milliGRAM(s) Oral every 6 hours  heparin  Infusion.  Unit(s)/Hr (11 mL/Hr) IV Continuous <Continuous>  pantoprazole    Tablet 40 milliGRAM(s) Oral before breakfast  polyethylene glycol 3350 17 Gram(s) Oral daily  senna 2 Tablet(s) Oral at bedtime      Assessment: 89 year old woman with HTN and HLD presents with respiratory distress and palpitations found to have a-fib with RVR    Plan of Care:    #Paroxysmal a-fib with RVR-   Patient spontaneously converted to sinus rhythm yesterday.   Change cardizem to Lopressor 25 mg BID and discontinue atenolol.   Obtain TTE to assess for structural heart disease.  CHADSII-Vasc = 4. Heparin gtt for AC.  Oral anticoagulation options will be dependent on TTE findings.     #HTN- BP labile in the ER.  Continue to monitor.    Care discussed at length with patient.     Over 35 minutes spent on total encounter; more than 50% of the visit was spent counseling and/or coordinating care by the attending physician.      Quang Ng MD [facility]  Cardiovascular Disease  [phone]
Patient is a 89y old  Female who presents with a chief complaint of pt was at MD office who sent pt straight to ED with respiratory concerns (20 Mar 2018 08:38)  pt seen and examined at bedside   SUBJECTIVE/OVERNIGHT events none, feels well, no cp/sob/palpitations    Vital Signs Last 24 Hrs  T(C): 36.3 (21 Mar 2018 13:29), Max: 36.7 (20 Mar 2018 13:47)  T(F): 97.4 (21 Mar 2018 13:29), Max: 98.1 (20 Mar 2018 13:47)  HR: 66 (21 Mar 2018 13:29) (66 - 110)  BP: 153/91 (21 Mar 2018 13:29) (130/77 - 153/91)  BP(mean): --  RR: 18 (21 Mar 2018 13:29) (18 - 18)  SpO2: 97% (21 Mar 2018 13:29) (95% - 98%)  CAPILLARY BLOOD GLUCOSE        MEDICATIONS  (STANDING):  atorvastatin 10 milliGRAM(s) Oral at bedtime  cholecalciferol 2000 Unit(s) Oral daily  heparin  Infusion.  Unit(s)/Hr (11 mL/Hr) IV Continuous <Continuous>  metoprolol tartrate 25 milliGRAM(s) Oral two times a day  pantoprazole    Tablet 40 milliGRAM(s) Oral before breakfast  polyethylene glycol 3350 17 Gram(s) Oral daily  senna 2 Tablet(s) Oral at bedtime    MEDICATIONS  (PRN):  heparin  Injectable 4500 Unit(s) IV Push every 6 hours PRN For aPTT less than 40  heparin  Injectable 2000 Unit(s) IV Push every 6 hours PRN For aPTT between 40 - 57    PHYSICAL EXAM  General : comfortable, not in any acute distress  Neck : supple, no LAD, no JVD  Eyes : EOMI, PERRLA, conjunctiva : no icterus, no pallor  MM moist, no pharyngeal erythema/exudates  Pulmonary: clear to auscultation bilateral lung fields, no crackles/rhonchi/wheezing,   CVS : S1 S2,rate normal-,rhythm-regular, no murmurs, no abnormal sounds  Gastrointestinal: soft, non tender, non distended, no organomegaly , bowel sounds audible  Extremities: no edema  Neuro: AAOx3, no focal deficits  Musculoskeletal:  FROM of all ext  Skin: no rash  LABS                        11.0   5.80  )-----------( 320      ( 21 Mar 2018 06:50 )             32.6     03-20    138  |  101  |  24<H>  ----------------------------<  109<H>  3.8   |  25  |  1.36<H>    Ca    8.1<L>      20 Mar 2018 04:30  Mg     1.0     03-20    TPro  7.1  /  Alb  3.7  /  TBili  0.6  /  DBili  x   /  AST  14  /  ALT  7   /  AlkPhos  84  03-19      RADIOLOGY and IMAGING reviewed: yes  Consultant notes reviewed : yes  Care discussed with Consultants/other providers :

## 2019-01-25 ENCOUNTER — APPOINTMENT (OUTPATIENT)
Dept: OTOLARYNGOLOGY | Facility: CLINIC | Age: 84
End: 2019-01-25
Payer: MEDICARE

## 2019-01-25 VITALS
HEART RATE: 68 BPM | HEIGHT: 64 IN | SYSTOLIC BLOOD PRESSURE: 139 MMHG | WEIGHT: 120 LBS | BODY MASS INDEX: 20.49 KG/M2 | DIASTOLIC BLOOD PRESSURE: 86 MMHG

## 2019-01-25 PROCEDURE — 99214 OFFICE O/P EST MOD 30 MIN: CPT | Mod: 25

## 2019-01-25 PROCEDURE — 69210 REMOVE IMPACTED EAR WAX UNI: CPT

## 2019-01-25 NOTE — ASSESSMENT
[FreeTextEntry1] : Patient follows up has hearing aids for [place] strain feeling better now working well change in batteries she had significant amount of wax bilaterally which was curetted out this helped I've encouraged her to go back to her cardiologist and have her hearing H. check she does have a underlying sensorineural hearing loss I do recommend she continue to followup on an annual basis.

## 2019-04-16 NOTE — DISCHARGE NOTE ADULT - BECAUSE OF A PHYSICAL, MENTAL OR EMOTIONAL CONDITION, DO YOU HAVE DIFFICULTY DOING  ERRANDS ALONE LIKE VISITING A DOCTOR'S OFFICE OR SHOPPING (15 YEARS AND OLDER)
----- Message from Teo Harris NP sent at 4/13/2019 11:30 AM EDT -----  Please let mom know that all of Rafael's labs are normal and do not indicate that he has a tick borne illness. He does not need to continue antibiotics. Thank you! No

## 2019-10-16 ENCOUNTER — APPOINTMENT (OUTPATIENT)
Dept: OTOLARYNGOLOGY | Facility: CLINIC | Age: 84
End: 2019-10-16
Payer: MEDICARE

## 2019-10-16 VITALS
SYSTOLIC BLOOD PRESSURE: 134 MMHG | WEIGHT: 120 LBS | HEIGHT: 64 IN | DIASTOLIC BLOOD PRESSURE: 88 MMHG | BODY MASS INDEX: 20.49 KG/M2 | HEART RATE: 79 BPM

## 2019-10-16 DIAGNOSIS — R04.0 EPISTAXIS: ICD-10-CM

## 2019-10-16 DIAGNOSIS — H90.2 CONDUCTIVE HEARING LOSS, UNSPECIFIED: ICD-10-CM

## 2019-10-16 PROCEDURE — 69210 REMOVE IMPACTED EAR WAX UNI: CPT

## 2019-10-16 PROCEDURE — 99213 OFFICE O/P EST LOW 20 MIN: CPT | Mod: 25

## 2019-10-16 NOTE — PHYSICAL EXAM
[de-identified] : bilat wax [] : septum deviated bilaterally [Midline] : trachea located in midline position [Normal] : no rashes

## 2019-10-16 NOTE — ASSESSMENT
[FreeTextEntry1] : wax-\par After informed verbal consent is obtained, cerumen is removed from the right and left  ear canal with a curette and suction.\par Rec: \par Debrox is to be placed in both ears on a routine basis to keep them free of wax.\par Routine debridement suggested to keep the ears free of wax.\par \par \par bilateral sensorineural hearing loss-cleared for hearing aids\par

## 2019-10-16 NOTE — REASON FOR VISIT
[Subsequent Evaluation] : a subsequent evaluation for [Hearing Loss] : hearing loss [Epistaxis] : epistaxis

## 2019-10-16 NOTE — HISTORY OF PRESENT ILLNESS
[No] : patient does not have a  history of radiation therapy [de-identified] : 89 yo female\par chronic wax impaction and hearing loss-moderate\par no nasal or throat complaints\par no other modifying factors\par \par \par \par  [Ear Fullness] : ear fullness [Hearing Loss] : hearing loss [Presbycusis] : presbycusis [Nasal Congestion] : nasal congestion [None] : No risk factors have been identified.

## 2020-03-09 ENCOUNTER — APPOINTMENT (OUTPATIENT)
Dept: UROGYNECOLOGY | Facility: CLINIC | Age: 85
End: 2020-03-09

## 2020-05-15 ENCOUNTER — APPOINTMENT (OUTPATIENT)
Dept: UROLOGY | Facility: CLINIC | Age: 85
End: 2020-05-15
Payer: MEDICARE

## 2020-05-15 VITALS
HEART RATE: 86 BPM | DIASTOLIC BLOOD PRESSURE: 89 MMHG | SYSTOLIC BLOOD PRESSURE: 150 MMHG | HEIGHT: 64 IN | BODY MASS INDEX: 20.49 KG/M2 | TEMPERATURE: 98.7 F | WEIGHT: 120 LBS

## 2020-05-15 DIAGNOSIS — R31.0 GROSS HEMATURIA: ICD-10-CM

## 2020-05-15 PROCEDURE — 99204 OFFICE O/P NEW MOD 45 MIN: CPT

## 2020-05-15 NOTE — ASSESSMENT
[FreeTextEntry1] : Suspect bladder cancer. Certainly contributing to irritative sx\par --Cytology, UA< UCx\par --CT\par --CYsto\par --Will consider TUR based on results\par

## 2020-05-15 NOTE — PHYSICAL EXAM
[General Appearance - Well Nourished] : well nourished [General Appearance - Well Developed] : well developed [Edema] : no peripheral edema [General Appearance - In No Acute Distress] : no acute distress [Exaggerated Use Of Accessory Muscles For Inspiration] : no accessory muscle use [Abdomen Tenderness] : non-tender [Abdomen Soft] : soft [FreeTextEntry1] : using walker [Skin Color & Pigmentation] : normal skin color and pigmentation [Costovertebral Angle Tenderness] : no ~M costovertebral angle tenderness [Oriented To Time, Place, And Person] : oriented to person, place, and time [Cervical Lymph Nodes Enlarged Anterior Bilaterally] : anterior cervical [No Focal Deficits] : no focal deficits [Supraclavicular Lymph Nodes Enlarged Bilaterally] : supraclavicular

## 2020-05-15 NOTE — HISTORY OF PRESENT ILLNESS
[FreeTextEntry1] : 89yo female with cc of bladder tumor and urinary sx. Pt reports that she has had issues with bothersome urinary frequency for less than a year. SHe went to see Dr Graciela Gomez and hematuria was noted. SHe has seen blood on occasion. SHe US that showed "multiple bladder masses up to 4.5cm" with normal [id number]/2019. SHe then underwent CT scan I- (1/2020) that showed 3.2cm mass on R posterior wall. Cytology was suspicious. Had cysto 12/2019 that showed 2 large smooth masses on R lateral wall with multiple papillary masses scattered throughout. Cr 1.7. \par \par Prior tobacco smoker, quit 30y ago, social smoker into her 60s. No exposure hx. Dad with hx of lung ca (tobacco smoker). Was previously on eliquis for afib but is no longer taking due to nose bleeds. Pt lives on her own. \par \par SHe has very bothersome frequency and urgency. SHe has urge incontinence. NOw not able to sleep through the night.

## 2020-05-15 NOTE — REVIEW OF SYSTEMS
[Negative] : Heme/Lymph [Feeling Tired] : feeling tired [Vomiting] : vomiting [Urine Infection (bladder/kidney)] : bladder/kidney infection [Blood in urine that you can see] : blood visible in urine [Wake up at night to urinate  How many times?  ___] : wakes up to urinate [unfilled] times during the night [Leakage of urine with urgency] : leakage of urine with urgency [Dizziness] : dizziness [Difficulty Walking] : difficulty walking

## 2020-05-18 LAB
APPEARANCE: ABNORMAL
BACTERIA UR CULT: NORMAL
BACTERIA: NEGATIVE
BILIRUBIN URINE: NEGATIVE
BLOOD URINE: ABNORMAL
COLOR: NORMAL
GLUCOSE QUALITATIVE U: NEGATIVE
HYALINE CASTS: 0 /LPF
KETONES URINE: NEGATIVE
LEUKOCYTE ESTERASE URINE: ABNORMAL
MICROSCOPIC-UA: NORMAL
NITRITE URINE: NEGATIVE
PH URINE: 7
PROTEIN URINE: ABNORMAL
RED BLOOD CELLS URINE: 50 /HPF
SPECIFIC GRAVITY URINE: >=1.03
SQUAMOUS EPITHELIAL CELLS: 0 /HPF
UROBILINOGEN URINE: NORMAL
WHITE BLOOD CELLS URINE: 70 /HPF

## 2020-05-21 ENCOUNTER — APPOINTMENT (OUTPATIENT)
Dept: CT IMAGING | Facility: CLINIC | Age: 85
End: 2020-05-21
Payer: MEDICARE

## 2020-05-21 ENCOUNTER — OUTPATIENT (OUTPATIENT)
Dept: OUTPATIENT SERVICES | Facility: HOSPITAL | Age: 85
LOS: 1 days | End: 2020-05-21
Payer: MEDICARE

## 2020-05-21 ENCOUNTER — RESULT REVIEW (OUTPATIENT)
Age: 85
End: 2020-05-21

## 2020-05-21 DIAGNOSIS — S72.001E: Chronic | ICD-10-CM

## 2020-05-21 DIAGNOSIS — C67.8 MALIGNANT NEOPLASM OF OVERLAPPING SITES OF BLADDER: ICD-10-CM

## 2020-05-21 PROCEDURE — 74176 CT ABD & PELVIS W/O CONTRAST: CPT | Mod: 26

## 2020-05-21 PROCEDURE — 74176 CT ABD & PELVIS W/O CONTRAST: CPT

## 2020-05-26 ENCOUNTER — APPOINTMENT (OUTPATIENT)
Dept: UROLOGY | Facility: CLINIC | Age: 85
End: 2020-05-26
Payer: MEDICARE

## 2020-05-26 DIAGNOSIS — C67.8 MALIGNANT NEOPLASM OF OVERLAPPING SITES OF BLADDER: ICD-10-CM

## 2020-05-26 LAB — URINE CYTOLOGY: NORMAL

## 2020-05-26 PROCEDURE — 99214 OFFICE O/P EST MOD 30 MIN: CPT | Mod: 95

## 2020-05-26 NOTE — ASSESSMENT
[FreeTextEntry1] : Bladder cancer. Reviewed findings with the patient and her son. Atypical presentation for TCC. Needs pulm eval to ensure no contraindication to anesthesia. \par --Re-eval with pulm\par --Trial of trospium in the interim\par

## 2020-05-26 NOTE — HISTORY OF PRESENT ILLNESS
[Other Location: e.g. School (Enter Location, City,State)___] : at [unfilled], at the time of the visit. [Other Location: e.g. Home (Enter Location, City,State)___] : at [unfilled] [FreeTextEntry1] : The patient-doctor relationship has been established in a face to face fashion via real time video/audio HIPAA compliant communication using telemedicine software. The patient's identity has been confirmed. The patient was previously emailed a copy of the telemedicine consent. They have had a chance to review and has now given verbal consent and has requested care to be assessed and treated through telemedicine and understands there maybe limitations in this process and they may need further follow up care in the office and or hospital settings.\par \par Verbal consent given to me on 05/26/2020 at 10:56 AM by Ms. CARMELO MARTÍNEZ [place] [address]. \par \par 89yo female with cc of bladder tumor and urinary sx. Pt reports that she has had issues with bothersome urinary frequency for less than a year. SHe went to see Dr Graciela Gomez and hematuria was noted. SHe has seen blood on occasion. SHe US that showed "multiple bladder masses up to 4.5cm" with normal [id number]/2019. SHe then underwent CT scan I- (1/2020) that showed 3.2cm mass on R posterior wall. Cytology was suspicious. Had cysto 12/2019 that showed 2 large smooth masses on R lateral wall with multiple papillary masses scattered throughout. Cr 1.7. \par \par Prior tobacco smoker, quit 30y ago, social smoker into her 60s. No exposure hx. Dad with hx of lung ca (tobacco smoker). Was previously on eliquis for afib but is no longer taking due to nose bleeds. Pt lives on her own. She has very bothersome frequency and urgency. SHe has urge incontinence. NOw not able to sleep through the night. \par \par Pt had urine cytology that was suspicious. She had CT scan that showed 5cm  and 3.4cm L hemithorax pleural based masses. She reports that she used to see a pulmonologist for lesions in her lung for about 8y. She ultimately opted to no longer observe. She was last seen by Dr Kuo ~2y ago.

## 2020-05-26 NOTE — REVIEW OF SYSTEMS
[Feeling Tired] : feeling tired [Vomiting] : vomiting [Urine Infection (bladder/kidney)] : bladder/kidney infection [Blood in urine that you can see] : blood visible in urine [Wake up at night to urinate  How many times?  ___] : wakes up to urinate [unfilled] times during the night [Leakage of urine with urgency] : leakage of urine with urgency [Dizziness] : dizziness [Difficulty Walking] : difficulty walking [Negative] : Heme/Lymph

## 2020-06-01 ENCOUNTER — APPOINTMENT (OUTPATIENT)
Dept: OTOLARYNGOLOGY | Facility: CLINIC | Age: 85
End: 2020-06-01

## 2020-06-01 DIAGNOSIS — J31.0 CHRONIC RHINITIS: ICD-10-CM

## 2020-06-01 DIAGNOSIS — H90.3 SENSORINEURAL HEARING LOSS, BILATERAL: ICD-10-CM

## 2020-06-01 DIAGNOSIS — J34.2 DEVIATED NASAL SEPTUM: ICD-10-CM

## 2020-06-01 DIAGNOSIS — H61.23 IMPACTED CERUMEN, BILATERAL: ICD-10-CM

## 2020-06-11 DIAGNOSIS — J94.8 OTHER SPECIFIED PLEURAL CONDITIONS: ICD-10-CM

## 2020-06-12 ENCOUNTER — APPOINTMENT (OUTPATIENT)
Dept: CT IMAGING | Facility: IMAGING CENTER | Age: 85
End: 2020-06-12

## 2020-06-19 ENCOUNTER — APPOINTMENT (OUTPATIENT)
Dept: CT IMAGING | Facility: IMAGING CENTER | Age: 85
End: 2020-06-19
Payer: MEDICARE

## 2020-06-19 ENCOUNTER — OUTPATIENT (OUTPATIENT)
Dept: OUTPATIENT SERVICES | Facility: HOSPITAL | Age: 85
LOS: 1 days | End: 2020-06-19
Payer: MEDICARE

## 2020-06-19 DIAGNOSIS — S72.001E: Chronic | ICD-10-CM

## 2020-06-19 DIAGNOSIS — C67.8 MALIGNANT NEOPLASM OF OVERLAPPING SITES OF BLADDER: ICD-10-CM

## 2020-06-19 PROCEDURE — 71250 CT THORAX DX C-: CPT | Mod: 26

## 2020-06-19 PROCEDURE — 71250 CT THORAX DX C-: CPT

## 2020-07-20 DIAGNOSIS — N39.0 URINARY TRACT INFECTION, SITE NOT SPECIFIED: ICD-10-CM

## 2020-07-20 LAB
APPEARANCE: ABNORMAL
BACTERIA: ABNORMAL
BILIRUBIN URINE: NEGATIVE
BLOOD URINE: ABNORMAL
COLOR: YELLOW
GLUCOSE QUALITATIVE U: NEGATIVE
HYALINE CASTS: 5 /LPF
KETONES URINE: NEGATIVE
LEUKOCYTE ESTERASE URINE: ABNORMAL
MICROSCOPIC-UA: NORMAL
NITRITE URINE: NEGATIVE
PH URINE: 7
PROTEIN URINE: ABNORMAL
RED BLOOD CELLS URINE: 550 /HPF
SPECIFIC GRAVITY URINE: 1.02
SQUAMOUS EPITHELIAL CELLS: 2 /HPF
UROBILINOGEN URINE: NORMAL
WHITE BLOOD CELLS URINE: >720 /HPF

## 2020-07-20 RX ORDER — TROSPIUM CHLORIDE 60 MG/1
60 CAPSULE, EXTENDED RELEASE ORAL
Qty: 30 | Refills: 11 | Status: DISCONTINUED | COMMUNITY
Start: 2020-05-26 | End: 2020-07-20

## 2020-07-20 RX ORDER — SULFAMETHOXAZOLE AND TRIMETHOPRIM 800; 160 MG/1; MG/1
800-160 TABLET ORAL
Qty: 14 | Refills: 0 | Status: ACTIVE | COMMUNITY
Start: 2020-07-20 | End: 1900-01-01

## 2020-07-22 RX ORDER — CEPHALEXIN 500 MG/1
500 CAPSULE ORAL TWICE DAILY
Qty: 14 | Refills: 0 | Status: ACTIVE | COMMUNITY
Start: 2020-07-22 | End: 1900-01-01

## 2020-07-23 LAB — BACTERIA UR CULT: ABNORMAL

## 2020-09-04 RX ORDER — PHENAZOPYRIDINE HYDROCHLORIDE 200 MG/1
200 TABLET ORAL 3 TIMES DAILY
Qty: 21 | Refills: 0 | Status: ACTIVE | COMMUNITY
Start: 2020-09-04 | End: 1900-01-01

## 2020-09-04 RX ORDER — SULFAMETHOXAZOLE AND TRIMETHOPRIM 800; 160 MG/1; MG/1
800-160 TABLET ORAL
Qty: 14 | Refills: 0 | Status: ACTIVE | COMMUNITY
Start: 2020-09-04 | End: 1900-01-01

## 2020-09-05 ENCOUNTER — INPATIENT (INPATIENT)
Facility: HOSPITAL | Age: 85
LOS: 3 days | Discharge: HOME HEALTH | End: 2020-09-09
Attending: STUDENT IN AN ORGANIZED HEALTH CARE EDUCATION/TRAINING PROGRAM | Admitting: STUDENT IN AN ORGANIZED HEALTH CARE EDUCATION/TRAINING PROGRAM
Payer: MEDICARE

## 2020-09-05 VITALS
SYSTOLIC BLOOD PRESSURE: 152 MMHG | RESPIRATION RATE: 25 BRPM | OXYGEN SATURATION: 100 % | HEART RATE: 107 BPM | DIASTOLIC BLOOD PRESSURE: 97 MMHG | TEMPERATURE: 101 F

## 2020-09-05 DIAGNOSIS — J98.59 OTHER DISEASES OF MEDIASTINUM, NOT ELSEWHERE CLASSIFIED: ICD-10-CM

## 2020-09-05 DIAGNOSIS — A41.9 SEPSIS, UNSPECIFIED ORGANISM: ICD-10-CM

## 2020-09-05 DIAGNOSIS — Z29.9 ENCOUNTER FOR PROPHYLACTIC MEASURES, UNSPECIFIED: ICD-10-CM

## 2020-09-05 DIAGNOSIS — S72.001E: Chronic | ICD-10-CM

## 2020-09-05 DIAGNOSIS — R79.89 OTHER SPECIFIED ABNORMAL FINDINGS OF BLOOD CHEMISTRY: ICD-10-CM

## 2020-09-05 DIAGNOSIS — N39.0 URINARY TRACT INFECTION, SITE NOT SPECIFIED: ICD-10-CM

## 2020-09-05 DIAGNOSIS — N32.89 OTHER SPECIFIED DISORDERS OF BLADDER: ICD-10-CM

## 2020-09-05 DIAGNOSIS — Z02.9 ENCOUNTER FOR ADMINISTRATIVE EXAMINATIONS, UNSPECIFIED: ICD-10-CM

## 2020-09-05 DIAGNOSIS — N12 TUBULO-INTERSTITIAL NEPHRITIS, NOT SPECIFIED AS ACUTE OR CHRONIC: ICD-10-CM

## 2020-09-05 DIAGNOSIS — I48.91 UNSPECIFIED ATRIAL FIBRILLATION: ICD-10-CM

## 2020-09-05 DIAGNOSIS — I10 ESSENTIAL (PRIMARY) HYPERTENSION: ICD-10-CM

## 2020-09-05 DIAGNOSIS — R41.82 ALTERED MENTAL STATUS, UNSPECIFIED: ICD-10-CM

## 2020-09-05 LAB
ALBUMIN SERPL ELPH-MCNC: 3.9 G/DL — SIGNIFICANT CHANGE UP (ref 3.3–5)
ALP SERPL-CCNC: 65 U/L — SIGNIFICANT CHANGE UP (ref 40–120)
ALT FLD-CCNC: 10 U/L — SIGNIFICANT CHANGE UP (ref 4–33)
ANION GAP SERPL CALC-SCNC: 13 MMO/L — SIGNIFICANT CHANGE UP (ref 7–14)
APPEARANCE UR: SIGNIFICANT CHANGE UP
AST SERPL-CCNC: 10 U/L — SIGNIFICANT CHANGE UP (ref 4–32)
BACTERIA # UR AUTO: HIGH
BASE EXCESS BLDV CALC-SCNC: -0.7 MMOL/L — SIGNIFICANT CHANGE UP
BASOPHILS # BLD AUTO: 0.03 K/UL — SIGNIFICANT CHANGE UP (ref 0–0.2)
BASOPHILS NFR BLD AUTO: 0.3 % — SIGNIFICANT CHANGE UP (ref 0–2)
BILIRUB SERPL-MCNC: 1.6 MG/DL — HIGH (ref 0.2–1.2)
BILIRUB UR-MCNC: NEGATIVE — SIGNIFICANT CHANGE UP
BLOOD GAS VENOUS - CREATININE: 1.46 MG/DL — HIGH (ref 0.5–1.3)
BLOOD UR QL VISUAL: SIGNIFICANT CHANGE UP
BUN SERPL-MCNC: 22 MG/DL — SIGNIFICANT CHANGE UP (ref 7–23)
CALCIUM SERPL-MCNC: 9.1 MG/DL — SIGNIFICANT CHANGE UP (ref 8.4–10.5)
CHLORIDE BLDV-SCNC: 102 MMOL/L — SIGNIFICANT CHANGE UP (ref 96–108)
CHLORIDE SERPL-SCNC: 96 MMOL/L — LOW (ref 98–107)
CO2 SERPL-SCNC: 21 MMOL/L — LOW (ref 22–31)
COLOR SPEC: SIGNIFICANT CHANGE UP
CREAT SERPL-MCNC: 1.42 MG/DL — HIGH (ref 0.5–1.3)
EOSINOPHIL # BLD AUTO: 0.02 K/UL — SIGNIFICANT CHANGE UP (ref 0–0.5)
EOSINOPHIL NFR BLD AUTO: 0.2 % — SIGNIFICANT CHANGE UP (ref 0–6)
GAS PNL BLDV: 126 MMOL/L — LOW (ref 136–146)
GLUCOSE BLDV-MCNC: 114 MG/DL — HIGH (ref 70–99)
GLUCOSE SERPL-MCNC: 118 MG/DL — HIGH (ref 70–99)
GLUCOSE UR-MCNC: NEGATIVE — SIGNIFICANT CHANGE UP
HCO3 BLDV-SCNC: 24 MMOL/L — SIGNIFICANT CHANGE UP (ref 20–27)
HCT VFR BLD CALC: 37.5 % — SIGNIFICANT CHANGE UP (ref 34.5–45)
HCT VFR BLDV CALC: 39.7 % — SIGNIFICANT CHANGE UP (ref 34.5–45)
HGB BLD-MCNC: 12.3 G/DL — SIGNIFICANT CHANGE UP (ref 11.5–15.5)
HGB BLDV-MCNC: 12.9 G/DL — SIGNIFICANT CHANGE UP (ref 11.5–15.5)
IMM GRANULOCYTES NFR BLD AUTO: 0.5 % — SIGNIFICANT CHANGE UP (ref 0–1.5)
KETONES UR-MCNC: NEGATIVE — SIGNIFICANT CHANGE UP
LACTATE BLDV-MCNC: 1.3 MMOL/L — SIGNIFICANT CHANGE UP (ref 0.5–2)
LEUKOCYTE ESTERASE UR-ACNC: HIGH
LYMPHOCYTES # BLD AUTO: 0.59 K/UL — LOW (ref 1–3.3)
LYMPHOCYTES # BLD AUTO: 5.7 % — LOW (ref 13–44)
MCHC RBC-ENTMCNC: 29.4 PG — SIGNIFICANT CHANGE UP (ref 27–34)
MCHC RBC-ENTMCNC: 32.8 % — SIGNIFICANT CHANGE UP (ref 32–36)
MCV RBC AUTO: 89.5 FL — SIGNIFICANT CHANGE UP (ref 80–100)
MONOCYTES # BLD AUTO: 0.43 K/UL — SIGNIFICANT CHANGE UP (ref 0–0.9)
MONOCYTES NFR BLD AUTO: 4.2 % — SIGNIFICANT CHANGE UP (ref 2–14)
MUCOUS THREADS # UR AUTO: SIGNIFICANT CHANGE UP
NEUTROPHILS # BLD AUTO: 9.21 K/UL — HIGH (ref 1.8–7.4)
NEUTROPHILS NFR BLD AUTO: 89.1 % — HIGH (ref 43–77)
NITRITE UR-MCNC: NEGATIVE — SIGNIFICANT CHANGE UP
NRBC # FLD: 0 K/UL — SIGNIFICANT CHANGE UP (ref 0–0)
PCO2 BLDV: 36 MMHG — LOW (ref 41–51)
PH BLDV: 7.42 PH — SIGNIFICANT CHANGE UP (ref 7.32–7.43)
PH UR: 7.5 — SIGNIFICANT CHANGE UP (ref 5–8)
PLATELET # BLD AUTO: 187 K/UL — SIGNIFICANT CHANGE UP (ref 150–400)
PMV BLD: 10.3 FL — SIGNIFICANT CHANGE UP (ref 7–13)
PO2 BLDV: 39 MMHG — SIGNIFICANT CHANGE UP (ref 35–40)
POTASSIUM BLDV-SCNC: 4 MMOL/L — SIGNIFICANT CHANGE UP (ref 3.4–4.5)
POTASSIUM SERPL-MCNC: 4.4 MMOL/L — SIGNIFICANT CHANGE UP (ref 3.5–5.3)
POTASSIUM SERPL-SCNC: 4.4 MMOL/L — SIGNIFICANT CHANGE UP (ref 3.5–5.3)
PROT SERPL-MCNC: 6.3 G/DL — SIGNIFICANT CHANGE UP (ref 6–8.3)
PROT UR-MCNC: 600 — HIGH
RBC # BLD: 4.19 M/UL — SIGNIFICANT CHANGE UP (ref 3.8–5.2)
RBC # FLD: 13.8 % — SIGNIFICANT CHANGE UP (ref 10.3–14.5)
RBC CASTS # UR COMP ASSIST: >50 — HIGH (ref 0–?)
SAO2 % BLDV: 70.9 % — SIGNIFICANT CHANGE UP (ref 60–85)
SODIUM SERPL-SCNC: 130 MMOL/L — LOW (ref 135–145)
SP GR SPEC: 1.02 — SIGNIFICANT CHANGE UP (ref 1–1.04)
SQUAMOUS # UR AUTO: SIGNIFICANT CHANGE UP
UROBILINOGEN FLD QL: NORMAL — SIGNIFICANT CHANGE UP
WBC # BLD: 10.33 K/UL — SIGNIFICANT CHANGE UP (ref 3.8–10.5)
WBC # FLD AUTO: 10.33 K/UL — SIGNIFICANT CHANGE UP (ref 3.8–10.5)
WBC UR QL: >50 — HIGH (ref 0–?)

## 2020-09-05 PROCEDURE — 71045 X-RAY EXAM CHEST 1 VIEW: CPT | Mod: 26

## 2020-09-05 PROCEDURE — 99285 EMERGENCY DEPT VISIT HI MDM: CPT

## 2020-09-05 RX ORDER — SODIUM CHLORIDE 9 MG/ML
500 INJECTION INTRAMUSCULAR; INTRAVENOUS; SUBCUTANEOUS ONCE
Refills: 0 | Status: COMPLETED | OUTPATIENT
Start: 2020-09-05 | End: 2020-09-05

## 2020-09-05 RX ORDER — ACETAMINOPHEN 500 MG
650 TABLET ORAL EVERY 6 HOURS
Refills: 0 | Status: DISCONTINUED | OUTPATIENT
Start: 2020-09-05 | End: 2020-09-09

## 2020-09-05 RX ORDER — ACETAMINOPHEN 500 MG
650 TABLET ORAL ONCE
Refills: 0 | Status: COMPLETED | OUTPATIENT
Start: 2020-09-05 | End: 2020-09-05

## 2020-09-05 RX ORDER — CEFTRIAXONE 500 MG/1
1000 INJECTION, POWDER, FOR SOLUTION INTRAMUSCULAR; INTRAVENOUS EVERY 24 HOURS
Refills: 0 | Status: DISCONTINUED | OUTPATIENT
Start: 2020-09-06 | End: 2020-09-09

## 2020-09-05 RX ORDER — SENNA PLUS 8.6 MG/1
2 TABLET ORAL AT BEDTIME
Refills: 0 | Status: DISCONTINUED | OUTPATIENT
Start: 2020-09-05 | End: 2020-09-09

## 2020-09-05 RX ORDER — METOPROLOL TARTRATE 50 MG
25 TABLET ORAL
Refills: 0 | Status: DISCONTINUED | OUTPATIENT
Start: 2020-09-05 | End: 2020-09-09

## 2020-09-05 RX ORDER — OMEPRAZOLE 10 MG/1
1 CAPSULE, DELAYED RELEASE ORAL
Qty: 0 | Refills: 0 | DISCHARGE

## 2020-09-05 RX ORDER — CHOLECALCIFEROL (VITAMIN D3) 125 MCG
1 CAPSULE ORAL
Qty: 0 | Refills: 0 | DISCHARGE

## 2020-09-05 RX ORDER — ATORVASTATIN CALCIUM 80 MG/1
1 TABLET, FILM COATED ORAL
Qty: 0 | Refills: 0 | DISCHARGE

## 2020-09-05 RX ORDER — HEPARIN SODIUM 5000 [USP'U]/ML
5000 INJECTION INTRAVENOUS; SUBCUTANEOUS EVERY 12 HOURS
Refills: 0 | Status: DISCONTINUED | OUTPATIENT
Start: 2020-09-05 | End: 2020-09-09

## 2020-09-05 RX ORDER — CEFTRIAXONE 500 MG/1
1000 INJECTION, POWDER, FOR SOLUTION INTRAMUSCULAR; INTRAVENOUS ONCE
Refills: 0 | Status: COMPLETED | OUTPATIENT
Start: 2020-09-05 | End: 2020-09-05

## 2020-09-05 RX ADMIN — SODIUM CHLORIDE 500 MILLILITER(S): 9 INJECTION INTRAMUSCULAR; INTRAVENOUS; SUBCUTANEOUS at 19:40

## 2020-09-05 RX ADMIN — Medication 650 MILLIGRAM(S): at 19:40

## 2020-09-05 RX ADMIN — CEFTRIAXONE 1000 MILLIGRAM(S): 500 INJECTION, POWDER, FOR SOLUTION INTRAMUSCULAR; INTRAVENOUS at 00:00

## 2020-09-05 RX ADMIN — CEFTRIAXONE 100 MILLIGRAM(S): 500 INJECTION, POWDER, FOR SOLUTION INTRAMUSCULAR; INTRAVENOUS at 19:40

## 2020-09-05 RX ADMIN — SODIUM CHLORIDE 500 MILLILITER(S): 9 INJECTION INTRAMUSCULAR; INTRAVENOUS; SUBCUTANEOUS at 00:00

## 2020-09-05 RX ADMIN — Medication 650 MILLIGRAM(S): at 00:00

## 2020-09-05 NOTE — H&P ADULT - NSHPLABSRESULTS_GEN_ALL_CORE
12.3   10.33 )-----------( 187      ( 05 Sep 2020 18:56 )             37.5     09-05    130<L>  |  96<L>  |  22  ----------------------------<  118<H>  4.4   |  21<L>  |  1.42<H>    Ca    9.1      05 Sep 2020 18:56    TPro  6.3  /  Alb  3.9  /  TBili  1.6<H>  /  DBili  x   /  AST  10  /  ALT  10  /  AlkPhos  65  09-05    Urinalysis (09.05.20 @ 19:05)    Color: REDDISH    Urine Appearance: TURBID    Glucose: NEGATIVE    Bilirubin: NEGATIVE    Ketone - Urine: NEGATIVE    Specific Gravity: 1.017    Blood: LARGE    pH - Urine: 7.5    Protein, Urine: 600    Urobilinogen: NORMAL    Nitrite: NEGATIVE    Leukocyte Esterase Concentration: LARGE    Red Blood Cell - Urine: >50    White Blood Cell - Urine: >50    Mucus: MODERATE    Bacteria: MANY    Squamous Epithelial: MODERATE      Blood Gas Venous Comprehensive (09.05.20 @ 18:56)    Blood Gas Venous - Lactate: 1.3: Please note updated reference range. mmol/L    Blood Gas Venous - Chloride: 102 mmol/L    Blood Gas Venous - Creatinine: 1.46 mg/dL    pH, Venous: 7.42 pH    pCO2, Venous: 36 mmHg    pO2, Venous: 39 mmHg    HCO3, Venous: 24 mmol/L    Base Excess, Venous: -0.7: REFERENCE RANGE = -3 + 2 mmol/L mmol/L    Oxygen Saturation, Venous: 70.9 %    Blood Gas Venous - Sodium: 126 mmol/L    Blood Gas Venous - Potassium: 4.0 mmol/L    Blood Gas Venous - Glucose: 114 mg/dL    Blood Gas Venous - Hemoglobin: 12.9 g/dL    Blood Gas Venous - Hematocrit: 39.7 % Labs personally reviewed by me below:    12.3   10.33 )-----------( 187      ( 05 Sep 2020 18:56 )             37.5     09-05    130<L>  |  96<L>  |  22  ----------------------------<  118<H>  4.4   |  21<L>  |  1.42<H>    Ca    9.1      05 Sep 2020 18:56    TPro  6.3  /  Alb  3.9  /  TBili  1.6<H>  /  DBili  x   /  AST  10  /  ALT  10  /  AlkPhos  65  09-05    Urinalysis (09.05.20 @ 19:05)    Color: REDDISH    Urine Appearance: TURBID    Glucose: NEGATIVE    Bilirubin: NEGATIVE    Ketone - Urine: NEGATIVE    Specific Gravity: 1.017    Blood: LARGE    pH - Urine: 7.5    Protein, Urine: 600    Urobilinogen: NORMAL    Nitrite: NEGATIVE    Leukocyte Esterase Concentration: LARGE    Red Blood Cell - Urine: >50    White Blood Cell - Urine: >50    Mucus: MODERATE    Bacteria: MANY    Squamous Epithelial: MODERATE      Blood Gas Venous Comprehensive (09.05.20 @ 18:56)    Blood Gas Venous - Lactate: 1.3: Please note updated reference range. mmol/L    Blood Gas Venous - Chloride: 102 mmol/L    Blood Gas Venous - Creatinine: 1.46 mg/dL    pH, Venous: 7.42 pH    pCO2, Venous: 36 mmHg    pO2, Venous: 39 mmHg    HCO3, Venous: 24 mmol/L    Base Excess, Venous: -0.7: REFERENCE RANGE = -3 + 2 mmol/L mmol/L    Oxygen Saturation, Venous: 70.9 %    Blood Gas Venous - Sodium: 126 mmol/L    Blood Gas Venous - Potassium: 4.0 mmol/L    Blood Gas Venous - Glucose: 114 mg/dL    Blood Gas Venous - Hemoglobin: 12.9 g/dL    Blood Gas Venous - Hematocrit: 39.7 %

## 2020-09-05 NOTE — H&P ADULT - HISTORY OF PRESENT ILLNESS
89F PMHx bladder mass, recurrent UTIs, anterior mediastinal mass, a-fib (not on AC d/t hematuria), and HTN presents with urinary frequency, dysuria, and AMS. Per discussion with patient's son, patient was confused, disoriented, and "out of it." At baseline, she is fully alert and oriented. For the past 6 months she has had urinary frequency to the extent of being unable to sleep, per discussion with the son, she has not undergone cystoscopy for this due to concerns about her age and she refuses a urinary catheter. For the past week she has had dysuria and urinary incontinence, and started treatment with Bactrim yesterday. Also reports recently noting pink urine. Per chart review of outpatient records, urine culture from July grew Klebsiella and Proteus. Denies subjective fevers, chills, cough, SOB, chest pain, nausea, vomiting, abdominal pain, diarrhea, constipation, fecal incontinence.    Notably, has recent chest CT showing interval enlargement of left pleural mass with mediastinal infiltration.  On arrival to ED, T 100.8, , /97, RR 27, SpO2 100%. UA was positive. Given 1g CTX, 500cc bolus IV NS, acetaminophen.

## 2020-09-05 NOTE — ED PROVIDER NOTE - NS ED ROS FT
CONST: no fevers, no chills, + confusion  EYES: no pain, no vision changes  ENT: no sore throat, no ear pain, no change in hearing  CV: no chest pain, no leg swelling  RESP: no shortness of breath, no cough  ABD: no abdominal pain, no nausea, no vomiting, no diarrhea  : + dysuria, no flank pain, + hematuria  MSK: no back pain, no extremity pain  NEURO: no headache or other neurological complaints  HEME: no easy bleeding  SKIN:  no rash

## 2020-09-05 NOTE — H&P ADULT - PROBLEM SELECTOR PLAN 1
+UA, fever, +right CVA tenderness, AMS. History of Klebsiella and Proteus on urine culture, both sensitive to CTX. History of recurrent UTIs in setting of bladder.   - S/p 1g ceftriaxone with improvement in mental status and fever.  - Continue with ceftriaxone 1g q24h  - Ultrasound kidney and bladder  - S/p 1 day of bactrim at home  - UCx collected in ED, follow up.  - Monitor vital signs

## 2020-09-05 NOTE — H&P ADULT - PROBLEM SELECTOR PLAN 4
Not on AC d/t history of hematuria and epistaxis on Eliquis  - Metoprolol 25mg BID - home dose  - NSR on EKG in ED

## 2020-09-05 NOTE — H&P ADULT - NSHPREVIEWOFSYSTEMS_GEN_ALL_CORE
CONSTITUTIONAL: No weakness, fevers or chills  EYES/ENT: No visual changes;  No throat pain   NECK: No pain or stiffness  RESPIRATORY: No cough, wheezing, hemoptysis; No shortness of breath  CARDIOVASCULAR: No chest pain or palpitations  GASTROINTESTINAL: No abdominal or epigastric pain. No nausea, vomiting, or hematemesis; No diarrhea or constipation. No melena or hematochezia. No fecal incontinence.  GENITOURINARY: +Dysuria, hematuria, urinary frequency, urinary incontinence  NEUROLOGICAL: No numbness or weakness  SKIN: No itching, burning, rashes, or lesions   All other review of systems is negative unless indicated above.

## 2020-09-05 NOTE — H&P ADULT - NSICDXPASTSURGICALHX_GEN_ALL_CORE_FT
PAST SURGICAL HISTORY:  Open right hip fracture, type I or II, with routine healing, subsequent encounter arthroplast in the past

## 2020-09-05 NOTE — H&P ADULT - PROBLEM SELECTOR PLAN 10
Transitions of Care Status:  1.  Name of PCP: Zeferino Garcia  2.  PCP Contacted on Admission: [ ] Y    [x] N  - Night admit  3.  PCP contacted at Discharge: [ ] Y    [ ] N    [ ] N/A  4.  Post-Discharge Appointment Date and Location:  5.  Summary of Handoff given to PCP: Pall care consult. Discuss multiple malignancies with family/son and determine if further w/u is preferred as likely poor surgical/chemo candidate vs hospice vs LTC placement.

## 2020-09-05 NOTE — H&P ADULT - PROBLEM SELECTOR PLAN 9
Transitions of Care Status:  1.  Name of PCP: Zeferino Garcia  2.  PCP Contacted on Admission: [ ] Y    [x] N  - Night admit  3.  PCP contacted at Discharge: [ ] Y    [ ] N    [ ] N/A  4.  Post-Discharge Appointment Date and Location:  5.  Summary of Handoff given to PCP: DVT: SQH, monitor for hematuria  Diet: Regular, DASH  Dispo: Pending  GOC: Discussed with SonTrenton. Currently FULL CODE, but considering goals of care. Possible palliative care c/s

## 2020-09-05 NOTE — ED ADULT TRIAGE NOTE - CHIEF COMPLAINT QUOTE
son called 911 for pt being lethargic all day,. pt recently diagnosed with UTI by urologist started on bactrim, pt  aware of self, place, and president's name, noted pt to have tachypnea, denies SOB, fever, chills, burning on urination. Hx of left knee surgery, HTN.  Rogelio Anderson [phone]

## 2020-09-05 NOTE — H&P ADULT - PROBLEM SELECTOR PLAN 3
Confused and disoriented at home and on initial presentation, rapidly improved with IVF and abx. No focal deficits.  - Continue to monitor  - Will not pursue CTH at this time. Confused and disoriented at home and on initial presentation, rapidly improved with IVF and abx. No focal deficits.  - Continue to monitor  - Will not pursue CTH at this time.  - Unlikely brain mets 2/2 chest and bladder masses Confused and disoriented at home and on initial presentation, rapidly improved with IVF and abx. No focal deficits. Likely metabolic encephalopathy from sepsis from UTI  - Continue to monitor  - Will not pursue CTH at this time.  - Obtain baseline from family

## 2020-09-05 NOTE — ED ADULT NURSE NOTE - OBJECTIVE STATEMENT
pt received to room #20, biba after son called EMS for AMS. pt currently aox2, c/o dysuria. abd soft, non tender, non distended. stage I noted between the gluteal folds. skin otherwise w/d/i. #20 s/l in L forearm from EMS, #20 s/l placed to right forearm. blood work obtained and sent to lab. UA/UC obtained via straight cath using sterile technique, sent to lab, COVID swab obtained and sent. pt RA O2 sat noted to be 92%, placed on 2L NC. Sinus tach on HM. Attending at bedside, will cont to monitor.

## 2020-09-05 NOTE — H&P ADULT - NSHPPHYSICALEXAM_GEN_ALL_CORE
PHYSICAL EXAM:  Vital Signs Last 24 Hrs  T(C): 37.1 (09-05-20 @ 22:16)  T(F): 98.7 (09-05-20 @ 22:16), Max: 100.8 (09-05-20 @ 18:43)  HR: 71 (09-05-20 @ 22:16) (71 - 107)  BP: 114/55 (09-05-20 @ 22:16)  BP(mean): --  RR: 21 (09-05-20 @ 22:16) (21 - 25)  SpO2: 98% (09-05-20 @ 22:16) (98% - 100%)  Wt(kg): --    Constitutional: NAD, awake and alert  EYES: EOMI  ENT:  Decreased Hearing, wearing L hearing aid, no tonsillar exudates, moist mucous membranes  Neck: Soft and supple , no thyromegaly   Respiratory: Tachypneic. Breath sounds are clear bilaterally, No wheezing, rales or rhonchi  Cardiovascular: S1 and S2, regular rate and rhythm, no Murmurs, gallops or rubs, no JVD   Gastrointestinal: Bowel Sounds present, soft, nondistended, no guarding, no rebound. +Suprapubic tenderness  Extremities: No cyanosis or clubbing; warm to touch  Vascular: 2+ peripheral pulses lower ex  Neurological: No focal deficits, CN II-XII intact bilaterally, sensation to light touch intact in all extremities. Pupils are equally reactive to light and symmetrical in size.   Musculoskeletal: 5/5 strength b/l upper and lower extremities; no joint swelling.  Skin: No rashes  Psych: AAOx4  HEME: no bruises, no nose bleeds PHYSICAL EXAM:  Vital Signs Last 24 Hrs  T(C): 37.1 (09-05-20 @ 22:16)  T(F): 98.7 (09-05-20 @ 22:16), Max: 100.8 (09-05-20 @ 18:43)  HR: 71 (09-05-20 @ 22:16) (71 - 107)  BP: 114/55 (09-05-20 @ 22:16)  BP(mean): --  RR: 21 (09-05-20 @ 22:16) (21 - 25)  SpO2: 98% (09-05-20 @ 22:16) (98% - 100%)  Wt(kg): --    Constitutional: NAD, awake and alert  EYES: EOMI  ENT:  Decreased Hearing, wearing L hearing aid, no tonsillar exudates, moist mucous membranes  Neck: Soft and supple , no thyromegaly   Respiratory: Tachypneic. Breath sounds are clear bilaterally, No wheezing, rales or rhonchi  Cardiovascular: S1 and S2, regular rate and rhythm, no Murmurs, gallops or rubs, no JVD   Gastrointestinal: Bowel Sounds present, soft, nondistended, no guarding, no rebound. +Suprapubic tenderness  Extremities: No cyanosis or clubbing; warm to touch  Vascular: 2+ peripheral pulses lower ex  Neurological: No focal deficits, CN II-XII intact bilaterally, sensation to light touch intact in all extremities. Pupils are equally reactive to light and symmetrical in size.   Musculoskeletal: 5/5 strength b/l upper and lower extremities; no joint swelling. Right CVA tenderness.  Skin: No rashes  Psych: AAOx4  HEME: no bruises, no nose bleeds

## 2020-09-05 NOTE — H&P ADULT - PROBLEM SELECTOR PLAN 7
Cr 1.4 on baseline 1. No BUN elevation. Possibly 2/2 Bactrim use vs JACQUELIN Cr 1.4 on baseline 1. No BUN elevation. Possibly 2/2 Bactrim use vs JACQUELIN  - Check urine lytes

## 2020-09-05 NOTE — ED PROVIDER NOTE - PHYSICAL EXAMINATION
Attending/Timo: NAD, PERRl/EOMI, supple, RRR, CTAB; Abd-soft, NT/ND, no LE edema, +2 DP/PT; A&Ox3, nonfocal; Skin-+hot/dry Attending/Timo: NAD, PERRl/EOMI, supple, RRR, CTAB; Abd-soft, NT/ND, no LE edema, +2 DP/PT; A&Ox3, nonfocal; Skin-+hot/dry    GENERAL: Awake, alert, NAD  HEENT: NC/AT, moist mucous membranes, PERRL, EOMI  LUNGS: Tachypneic, CTAB, no wheezes or crackles   CARDIAC: RRR, no m/r/g  ABDOMEN: Soft, normal BS, non tender, non distended, no rebound, no guarding  BACK: No midline spinal tenderness, no CVA tenderness  EXT: No edema, no calf tenderness, 2+ DP pulses bilaterally, no deformities.  NEURO: A&Ox3. Moving all extremities. No FND.   SKIN: Hot and dry.   PSYCH: Normal affect.

## 2020-09-05 NOTE — H&P ADULT - ASSESSMENT
89F PMHx 89F PMHx bladder mass, recurrent UTIs, anterior mediastinal mass, a-fib (not on AC d/t hematuria), and HTN presents with urinary frequency, dysuria, and AMS likely pyelonephritis.

## 2020-09-05 NOTE — ED ADULT NURSE NOTE - CHIEF COMPLAINT QUOTE
son called [phone] for pt being lethargic all day,. pt recently diagnosed with UTI by urologist started on bactrim, pt  aware of self, place, and president's name, noted pt to have tachypnea, denies SOB, fever, chills, burning on urination. Hx of left knee surgery, HTN.  Rogelio Anderson [phone]

## 2020-09-05 NOTE — H&P ADULT - PROBLEM SELECTOR PLAN 8
DVT: SQH, monitor for hematuria  Diet: Regular, DASH  Dispo: Pending  GOC: Discussed with SonTrenton. Currently FULL CODE, but considering GOC. Interval enlargement on June Chest CT. Possibly contributing to tachypnea. Per discussion with son, no further workup was being pursued.  - F/u CXR  - Follow-up goals of care with son and patient regarding further workup. Possible palliative C/s  - Likely outpatient f/u

## 2020-09-05 NOTE — H&P ADULT - NSICDXPASTMEDICALHX_GEN_ALL_CORE_FT
PAST MEDICAL HISTORY:  Atrial fibrillation     Bladder mass     Essential hypertension     Hyperlipidemia     Mediastinal mass     Rib fracture, left, closed, initial encounter in sept of 2015, recovering.

## 2020-09-05 NOTE — H&P ADULT - PROBLEM SELECTOR PLAN 6
6 month history of bladder mass causing urinary frequency and incontinence. Concern for malignancy given age and smoking history. Per son, not pursuing workup further given risks with diagnostic/therapeutic procedure.   - Primafit for urinary incontinence  - Consider urology consult in daytime 6 month history of bladder mass causing urinary frequency and incontinence. Concern for malignancy given age and smoking history. Per son, not pursuing workup further given risks with diagnostic/therapeutic procedure.   - Primafit for urinary incontinence  - Consider urology consult in daytime  - Clarify with son if patient is taking trospium, noted in [employer] but per son, does not take.

## 2020-09-05 NOTE — H&P ADULT - ATTENDING COMMENTS
I have personally seen, examined, and participated in the care of this patient.  I have reviewed all pertinent clinical information, including history, physical exam, plan, and the resident's note and agree except as noted.    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 Has multiple UTIs and chronic polyuria requiring frequent abx. Per  notes, has been trying to f/u with anesthesia and pulm? Regarding further w/u? Unclear if actually planned for any. Yesterday prescribed Bactrim for polyuria but only took two doses. Son was concern for worsened lethargy and sent patient in. At baseline fully alert and oriented but today became more confused about medications and not oriented. Last few days dysuria, hematuria and polyuria.  In ED, fever 100.8, WBC 10 with left shift, positive UA, given 1g CTX and 500cc NS bolus. No CTH done. CXR done and pending read. Currently in ED fully oriented and no longer confused after abx. No focal deficits.    Labs personally reviewed and notable for WBC 10, Na 130, Cr 1.4 (baseline 0.97). UA large blood, WBC >50, large leuk esterase  I have personally reviewed this patient’s EKG and my interpretation is NSR, RBBB  I have personally reviewed this patient’s CXR and my interpretation is enlarged mediastinum, elevated left hemidiaphragm, rightward mediastinal shift. No pleural effusions, no consolidation, no pulm edema    #AMS  Likely 2/2 to UTI. Treat UTI. Currently improved after abx and no focal deficits. Fully oriented and able to provide hx. Not lethargic. Hold off on CTH for now as likely acute metabolic encephalopathy 2/2 sepsis 2/2 UTI. Continue to monitor MS on abx for now. If worsens, obtain CTH. Have family evaluate if now at baseline.    #Pyelonephritis  UA positive with large leuk esterase, hematuria and pyuria. Also with polyuria/dysuria and CVA TTP consistent with pyelonephritis. C/w CTX qd, f/u UCx, monitor VS, CBC. Appears to have recurrent UTI. Last UCx mid-July with klebsiella and proteus (both sensitive to CTX). Has bladder mass? Which likely precipitates multiple UTIs.  started trospium outpatient for chronic polyuria though unclear if taking. Would clarify with family    #Bladder mass  Per  notes may be atypical TCC. Appears w/u is underway and were reaching out to anesthesia for possible procedure? Obtain further records and collateral with outpatient . On Trospium for polyuria.    #Mediastinal mass  Obtain OSH records. Appears to have previously followed with Dr. Kuo but is no longer getting surveillance of lung malignancy? Vs invasive thymoma on imaging 6/2019. Mildly tachypneic currently but not hypoxic, may be in the setting of sepsis. Will closely monitor respiratory status and f/u official CXR. May be 2/2 to lung/mediastinal mass as well     #Goals of care  Pall care consult. Discuss multiple malignancies with family/son and determine if further w/u is preferred as likely poor surgical/chemo candidate vs hospice vs LTC placement.    #JACQUELIN  Likely prerenal vs sepsis. Treat infection and s/p 500cc NS bolus. IVF until MS improves. Repeat BMP in AM. USRB given bladder mass, urine lytes.    #Hyponatremia  Likely volume depletion. S/p 500cc NS bolus and repeat in AM. If worsens send further urine studies    #HTN  Continue home anti-HTNs    #HLD  Continue home statin

## 2020-09-05 NOTE — ED PROVIDER NOTE - OBJECTIVE STATEMENT
89 year old F presenting with AMS in setting of recent urinary tract infection. 89 year old F presenting with AMS in setting of recent urinary tract infection.    Attending/Timo: 89 yo F 89 year old F presenting with AMS in setting of recent urinary tract infection.    Attending/Timo: 89 yo F h/o HTN, remote h/o for Afib (off Eliquis due to hematuria) recent diagnosis of a bladder mass ~6 months ago (limited work up due to age) BIBEMS due to a change in mental status. As per patient's son (Justin) pt has been several courses of Abx for UTIs. Pt started on bactrim last night and today her son noted pt to be lethargic. Pt admits to feeling fatigue, forgetful and frequent ruination. Denies ever./chills, abd pain, n/v, CP or SOB. 89 year old F presenting with AMS in setting of recent urinary tract infection. When asked why patient is in ED, stated "because I couldn't remember how to take my medications." States she has urinary frequency and urgency, frequent accidents. Denies fevers, chills, N/V/D, CP, SOB, abdominal pain. Per patient's son, diagnosed with a bladder mass 6 months ago and follows with urology. Was prescribed Bactrim yesterday, took two doses, then son noticed mother was more lethargic and forgetful, prompting him to call [phone].     Attending/Timo: 89 yo F h/o HTN, remote h/o for Afib (off Eliquis due to hematuria) recent diagnosis of a bladder mass ~6 months ago (limited work up due to age) BIBEMS due to a change in mental status. As per patient's son (Justin) pt has been several courses of Abx for UTIs. Pt started on bactrim last night and today her son noted pt to be lethargic. Pt admits to feeling fatigue, forgetful and frequent ruination. Denies ever./chills, abd pain, n/v, CP or SOB.

## 2020-09-05 NOTE — H&P ADULT - PROBLEM SELECTOR PLAN 2
, T 100.8, RR 27 in ED with altered mental status. Improved w acetaminophen and ceftriaxone. Lactate WNL.   - S/p 500 cc bolus NS , T 100.8, RR 27 in ED with altered mental status. Improved w acetaminophen and ceftriaxone. Lactate WNL.   - S/p 500 cc bolus NS  - F/u CXR

## 2020-09-06 DIAGNOSIS — Z71.89 OTHER SPECIFIED COUNSELING: ICD-10-CM

## 2020-09-06 LAB
ALBUMIN SERPL ELPH-MCNC: 3.5 G/DL — SIGNIFICANT CHANGE UP (ref 3.3–5)
ALP SERPL-CCNC: 72 U/L — SIGNIFICANT CHANGE UP (ref 40–120)
ALT FLD-CCNC: 15 U/L — SIGNIFICANT CHANGE UP (ref 4–33)
ANION GAP SERPL CALC-SCNC: 14 MMO/L — SIGNIFICANT CHANGE UP (ref 7–14)
APTT BLD: 29.5 SEC — SIGNIFICANT CHANGE UP (ref 27–36.3)
AST SERPL-CCNC: 19 U/L — SIGNIFICANT CHANGE UP (ref 4–32)
BASOPHILS # BLD AUTO: 0.02 K/UL — SIGNIFICANT CHANGE UP (ref 0–0.2)
BASOPHILS NFR BLD AUTO: 0.3 % — SIGNIFICANT CHANGE UP (ref 0–2)
BILIRUB SERPL-MCNC: 1 MG/DL — SIGNIFICANT CHANGE UP (ref 0.2–1.2)
BUN SERPL-MCNC: 23 MG/DL — SIGNIFICANT CHANGE UP (ref 7–23)
CALCIUM SERPL-MCNC: 9.3 MG/DL — SIGNIFICANT CHANGE UP (ref 8.4–10.5)
CHLORIDE SERPL-SCNC: 95 MMOL/L — LOW (ref 98–107)
CHLORIDE UR-SCNC: 77 MMOL/L — SIGNIFICANT CHANGE UP
CO2 SERPL-SCNC: 23 MMOL/L — SIGNIFICANT CHANGE UP (ref 22–31)
CREAT ?TM UR-MCNC: 51.9 MG/DL — SIGNIFICANT CHANGE UP
CREAT SERPL-MCNC: 1.34 MG/DL — HIGH (ref 0.5–1.3)
EOSINOPHIL # BLD AUTO: 0.03 K/UL — SIGNIFICANT CHANGE UP (ref 0–0.5)
EOSINOPHIL NFR BLD AUTO: 0.5 % — SIGNIFICANT CHANGE UP (ref 0–6)
GLUCOSE SERPL-MCNC: 125 MG/DL — HIGH (ref 70–99)
HCT VFR BLD CALC: 42.1 % — SIGNIFICANT CHANGE UP (ref 34.5–45)
HGB BLD-MCNC: 13.4 G/DL — SIGNIFICANT CHANGE UP (ref 11.5–15.5)
IMM GRANULOCYTES NFR BLD AUTO: 0.3 % — SIGNIFICANT CHANGE UP (ref 0–1.5)
INR BLD: 1.24 — HIGH (ref 0.88–1.16)
LYMPHOCYTES # BLD AUTO: 0.45 K/UL — LOW (ref 1–3.3)
LYMPHOCYTES # BLD AUTO: 7 % — LOW (ref 13–44)
MAGNESIUM SERPL-MCNC: 1.5 MG/DL — LOW (ref 1.6–2.6)
MCHC RBC-ENTMCNC: 28.7 PG — SIGNIFICANT CHANGE UP (ref 27–34)
MCHC RBC-ENTMCNC: 31.8 % — LOW (ref 32–36)
MCV RBC AUTO: 90.1 FL — SIGNIFICANT CHANGE UP (ref 80–100)
MONOCYTES # BLD AUTO: 0.27 K/UL — SIGNIFICANT CHANGE UP (ref 0–0.9)
MONOCYTES NFR BLD AUTO: 4.2 % — SIGNIFICANT CHANGE UP (ref 2–14)
NEUTROPHILS # BLD AUTO: 5.62 K/UL — SIGNIFICANT CHANGE UP (ref 1.8–7.4)
NEUTROPHILS NFR BLD AUTO: 87.7 % — HIGH (ref 43–77)
NRBC # FLD: 0 K/UL — SIGNIFICANT CHANGE UP (ref 0–0)
OSMOLALITY UR: 423 MOSMO/KG — SIGNIFICANT CHANGE UP (ref 50–1200)
PHOSPHATE SERPL-MCNC: 3.3 MG/DL — SIGNIFICANT CHANGE UP (ref 2.5–4.5)
PLATELET # BLD AUTO: 189 K/UL — SIGNIFICANT CHANGE UP (ref 150–400)
PMV BLD: 10.5 FL — SIGNIFICANT CHANGE UP (ref 7–13)
POTASSIUM SERPL-MCNC: 4.9 MMOL/L — SIGNIFICANT CHANGE UP (ref 3.5–5.3)
POTASSIUM SERPL-SCNC: 4.9 MMOL/L — SIGNIFICANT CHANGE UP (ref 3.5–5.3)
POTASSIUM UR-SCNC: 36.4 MMOL/L — SIGNIFICANT CHANGE UP
PROT SERPL-MCNC: 6 G/DL — SIGNIFICANT CHANGE UP (ref 6–8.3)
PROTHROM AB SERPL-ACNC: 14 SEC — HIGH (ref 10.6–13.6)
RBC # BLD: 4.67 M/UL — SIGNIFICANT CHANGE UP (ref 3.8–5.2)
RBC # FLD: 14.1 % — SIGNIFICANT CHANGE UP (ref 10.3–14.5)
SARS-COV-2 RNA SPEC QL NAA+PROBE: SIGNIFICANT CHANGE UP
SODIUM SERPL-SCNC: 132 MMOL/L — LOW (ref 135–145)
SODIUM UR-SCNC: 90 MMOL/L — SIGNIFICANT CHANGE UP
WBC # BLD: 6.41 K/UL — SIGNIFICANT CHANGE UP (ref 3.8–10.5)
WBC # FLD AUTO: 6.41 K/UL — SIGNIFICANT CHANGE UP (ref 3.8–10.5)

## 2020-09-06 PROCEDURE — 99223 1ST HOSP IP/OBS HIGH 75: CPT | Mod: GC

## 2020-09-06 PROCEDURE — 76770 US EXAM ABDO BACK WALL COMP: CPT | Mod: 26

## 2020-09-06 PROCEDURE — 12345: CPT | Mod: NC,GC

## 2020-09-06 RX ORDER — INFLUENZA VIRUS VACCINE 15; 15; 15; 15 UG/.5ML; UG/.5ML; UG/.5ML; UG/.5ML
0.5 SUSPENSION INTRAMUSCULAR ONCE
Refills: 0 | Status: DISCONTINUED | OUTPATIENT
Start: 2020-09-06 | End: 2020-09-09

## 2020-09-06 RX ORDER — LANOLIN ALCOHOL/MO/W.PET/CERES
3 CREAM (GRAM) TOPICAL AT BEDTIME
Refills: 0 | Status: DISCONTINUED | OUTPATIENT
Start: 2020-09-06 | End: 2020-09-09

## 2020-09-06 RX ORDER — MAGNESIUM SULFATE 500 MG/ML
2 VIAL (ML) INJECTION ONCE
Refills: 0 | Status: COMPLETED | OUTPATIENT
Start: 2020-09-06 | End: 2020-09-06

## 2020-09-06 RX ORDER — INFLUENZA VIRUS VACCINE 15; 15; 15; 15 UG/.5ML; UG/.5ML; UG/.5ML; UG/.5ML
0.5 SUSPENSION INTRAMUSCULAR ONCE
Refills: 0 | Status: DISCONTINUED | OUTPATIENT
Start: 2020-09-06 | End: 2020-09-06

## 2020-09-06 RX ADMIN — HEPARIN SODIUM 5000 UNIT(S): 5000 INJECTION INTRAVENOUS; SUBCUTANEOUS at 07:08

## 2020-09-06 RX ADMIN — CEFTRIAXONE 100 MILLIGRAM(S): 500 INJECTION, POWDER, FOR SOLUTION INTRAMUSCULAR; INTRAVENOUS at 22:07

## 2020-09-06 RX ADMIN — Medication 50 GRAM(S): at 17:02

## 2020-09-06 RX ADMIN — Medication 25 MILLIGRAM(S): at 17:04

## 2020-09-06 RX ADMIN — Medication 25 MILLIGRAM(S): at 07:08

## 2020-09-06 RX ADMIN — HEPARIN SODIUM 5000 UNIT(S): 5000 INJECTION INTRAVENOUS; SUBCUTANEOUS at 17:05

## 2020-09-06 RX ADMIN — Medication 3 MILLIGRAM(S): at 22:07

## 2020-09-06 NOTE — PROGRESS NOTE ADULT - ASSESSMENT
89F PMHx bladder mass, recurrent UTIs, anterior mediastinal mass, a-fib (not on AC d/t hematuria), and HTN presents with urinary frequency, dysuria, and AMS likely pyelonephritis. 89F PMHx bladder mass, recurrent UTIs, anterior mediastinal mass, a-fib (not on AC d/t hematuria), and HTN presents with urinary frequency, dysuria, and AMS, admitted with metabolic encephalopathy 2/2 sepsis (+fever/tachycardia) 2/2 pyelonephritis (+flank tenderness, +UA)

## 2020-09-06 NOTE — PROGRESS NOTE ADULT - PROBLEM SELECTOR PLAN 1
+UA, fever, +right CVA tenderness, AMS. History of Klebsiella and Proteus on urine culture, both sensitive to CTX. History of recurrent UTIs in setting of bladder.   - S/p 1g ceftriaxone with improvement in mental status and fever  - Continue with ceftriaxone 1g q24h  - Ultrasound kidney and bladder  - S/p 1 day of bactrim at home  - UCx collected in ED, awaiting results  - Renal US pending  - Patient afebrile, continue to monitor vitals and CBC (WBC WNL)

## 2020-09-06 NOTE — PROGRESS NOTE ADULT - PROBLEM SELECTOR PLAN 8
Interval enlargement on June Chest CT. Possibly contributing to tachypnea. Per discussion with son, no further workup was being pursued.  - F/u CXR  - Follow-up goals of care with son and patient regarding further workup. Possible palliative C/s  - Likely outpatient f/u Interval enlargement on June Chest CT. Possibly contributing to tachypnea. Per discussion with son, no further workup was being pursued.  - Follow-up goals of care with son and patient   - palliative C/s  - Likely outpatient f/u

## 2020-09-06 NOTE — PROGRESS NOTE ADULT - PROBLEM SELECTOR PLAN 6
6 month history of bladder mass causing urinary frequency and incontinence. Concern for malignancy given age and smoking history. Per son, not pursuing workup further given risks with diagnostic/therapeutic procedure.   - Primafit for urinary incontinence  - Consider urology consult in daytime  - Clarify with son if patient is taking trospium, noted in [employer] but per son, does not take.

## 2020-09-06 NOTE — PROGRESS NOTE ADULT - PROBLEM SELECTOR PLAN 7
Cr 1.4 on baseline 1. No BUN elevation. Possibly 2/2 Bactrim use vs JACQUELNI  - Check urine lytes Cr 1.4 on baseline 1. No BUN elevation. Possibly 2/2 Bactrim use vs JACQUELIN  - Check urine lytes  - monitor Cr/UOP

## 2020-09-06 NOTE — PROGRESS NOTE ADULT - PROBLEM SELECTOR PLAN 2
, T 100.8, RR 27 in ED with altered mental status. Improved w acetaminophen and ceftriaxone. Lactate WNL.   - S/p 500 cc bolus NS  - CXR notable only for mediastinal mass , T 100.8, RR 27 in ED with altered mental status. Improved w acetaminophen and ceftriaxone. Lactate WNL.   - S/p 500 cc bolus NS  - CXR notable only for mediastinal mass  - sepsis improving, c/w antibiotics as above, f/u culture speciation and sensitivities

## 2020-09-06 NOTE — PROGRESS NOTE ADULT - ATTENDING COMMENTS
Patient seen and examined, d/w Dr. Kirkpatrick, agree with above.     89 yo F w/ mediastinal and bladder mass, hx recurrent UTIs, a/w metabolic encephalopathy 2/2 sepsis 2/2 pyelonephritis, clinically improving on antibiotics. Patient reports feeling better, back to her normal self is enjoying her lunch. Continue with antibiotics, f/u Cultures speciation and sensitivities and tailor regimen as appropriate. Previously decided against further workup of masses. Appreciate palliative assistance.

## 2020-09-06 NOTE — PROGRESS NOTE ADULT - SUBJECTIVE AND OBJECTIVE BOX
PROGRESS NOTE:   Authored by Jaspal Kirkpatrick MD  Pager: [phone]; [facility] [phone]    Patient is a 89y old  Female who presents with a chief complaint of Pyelonephritis (05 Sep 2020 22:24)      SUBJECTIVE / OVERNIGHT EVENTS: No acute events overnight.    ADDITIONAL REVIEW OF SYSTEMS: Reports dysuria, urinary frequency, and mild R flank pain. Denies fever, chills, diaphoresis, abdominal pain, myalgia, headache, chest pain, dyspnea, and weakness.     MEDICATIONS  (STANDING):  cefTRIAXone   IVPB 1000 milliGRAM(s) IV Intermittent every 24 hours  heparin   Injectable 5000 Unit(s) SubCutaneous every 12 hours  influenza   Vaccine 0.5 milliLiter(s) IntraMuscular once  metoprolol tartrate 25 milliGRAM(s) Oral two times a day    MEDICATIONS  (PRN):  acetaminophen   Tablet .. 650 milliGRAM(s) Oral every 6 hours PRN Temp greater or equal to 38C (100.4F), Mild Pain (1 - 3)  senna 2 Tablet(s) Oral at bedtime PRN Constipation      CAPILLARY BLOOD GLUCOSE      POCT Blood Glucose.: 111 mg/dL (05 Sep 2020 18:43)    I&O's Summary      PHYSICAL EXAM:  Vital Signs Last 24 Hrs  T(C): 36.7 (06 Sep 2020 06:56), Max: 38.2 (05 Sep 2020 18:43)  T(F): 98.1 (06 Sep 2020 06:56), Max: 100.8 (05 Sep 2020 18:43)  HR: 81 (06 Sep 2020 06:56) (69 - 107)  BP: 137/92 (06 Sep 2020 06:56) (114/55 - 155/87)  BP(mean): --  RR: 17 (06 Sep 2020 06:56) (16 - 25)  SpO2: 100% (06 Sep 2020 06:56) (98% - 100%)    GENERAL: No acute distress, well-developed  HEAD:  Atraumatic, Normocephalic  EYES: EOMI, PERRLA, conjunctiva and sclera clear  NECK: Supple, no lymphadenopathy, no JVD  CHEST/LUNG: CTAB; No wheezes, rales, or rhonchi  HEART: Regular rate and rhythm; No murmurs, rubs, or gallops  ABDOMEN: Soft, mild tenderness over epigastrum, quadrants nonTTP, non-distended; normal bowel sounds, no organomegaly, R CVA TENDERNESS  EXTREMITIES:  2+ peripheral pulses b/l, No clubbing, cyanosis, or edema  NEUROLOGY: A&O x 3, no focal deficits  SKIN: No rashes or lesions    LABS:                        12.3   10.33 )-----------( 187      ( 05 Sep 2020 18:56 )             37.5     09-05    130<L>  |  96<L>  |  22  ----------------------------<  118<H>  4.4   |  21<L>  |  1.42<H>    Ca    9.1      05 Sep 2020 18:56    TPro  6.3  /  Alb  3.9  /  TBili  1.6<H>  /  DBili  x   /  AST  10  /  ALT  10  /  AlkPhos  65  09-05          Urinalysis Basic - ( 05 Sep 2020 19:05 )    Color: REDDISH / Appearance: TURBID / S.017 / pH: 7.5  Gluc: NEGATIVE / Ketone: NEGATIVE  / Bili: NEGATIVE / Urobili: NORMAL   Blood: LARGE / Protein: 600 / Nitrite: NEGATIVE   Leuk Esterase: LARGE / RBC: >50 / WBC >50   Sq Epi: MODERATE / Non Sq Epi: x / Bacteria: MANY          RADIOLOGY & ADDITIONAL TESTS:  Results Reviewed:   Imaging Personally Reviewed:  Electrocardiogram Personally Reviewed:    COORDINATION OF CARE:  Care Discussed with Consultants/Other Providers [Y/N]:  Prior or Outpatient Records Reviewed [Y/N]: PROGRESS NOTE:   Authored by Jaspal Kirkpatrick MD  Pager: [phone]; [facility] [phone]    Patient is a 89y old  Female who presents with a chief complaint of Pyelonephritis (05 Sep 2020 22:24)      SUBJECTIVE / OVERNIGHT EVENTS: No acute events overnight.    ADDITIONAL REVIEW OF SYSTEMS: Reports dysuria, urinary frequency, and mild R flank pain. Denies fever, chills, diaphoresis, abdominal pain, myalgia, headache, chest pain, dyspnea, and weakness.     MEDICATIONS  (STANDING):  cefTRIAXone   IVPB 1000 milliGRAM(s) IV Intermittent every 24 hours  heparin   Injectable 5000 Unit(s) SubCutaneous every 12 hours  influenza   Vaccine 0.5 milliLiter(s) IntraMuscular once  metoprolol tartrate 25 milliGRAM(s) Oral two times a day    MEDICATIONS  (PRN):  acetaminophen   Tablet .. 650 milliGRAM(s) Oral every 6 hours PRN Temp greater or equal to 38C (100.4F), Mild Pain (1 - 3)  senna 2 Tablet(s) Oral at bedtime PRN Constipation      CAPILLARY BLOOD GLUCOSE      POCT Blood Glucose.: 111 mg/dL (05 Sep 2020 18:43)    I&O's Summary      PHYSICAL EXAM:  Vital Signs Last 24 Hrs  T(C): 36.7 (06 Sep 2020 06:56), Max: 38.2 (05 Sep 2020 18:43)  T(F): 98.1 (06 Sep 2020 06:56), Max: 100.8 (05 Sep 2020 18:43)  HR: 81 (06 Sep 2020 06:56) (69 - 107)  BP: 137/92 (06 Sep 2020 06:56) (114/55 - 155/87)  BP(mean): --  RR: 17 (06 Sep 2020 06:56) (16 - 25)  SpO2: 100% (06 Sep 2020 06:56) (98% - 100%)    GENERAL: No acute distress, well-developed  HEAD:  Atraumatic, Normocephalic  EYES: EOMI, PERRLA, conjunctiva and sclera clear  NECK: Supple, no lymphadenopathy, no JVD  CHEST/LUNG: CTAB; No wheezes, rales, or rhonchi  HEART: Regular rate and rhythm; No murmurs, rubs, or gallops  ABDOMEN: Soft, mild tenderness over epigastrum, quadrants nonTTP, non-distended; normal bowel sounds, no organomegaly, R CVA TENDERNESS  EXTREMITIES:  2+ peripheral pulses b/l, No clubbing, cyanosis, or edema  NEUROLOGY: A&O x 3, no focal deficits  SKIN: No rashes or lesions    LABS:                        12.3   10.33 )-----------( 187      ( 05 Sep 2020 18:56 )             37.5     09-05    130<L>  |  96<L>  |  22  ----------------------------<  118<H>  4.4   |  21<L>  |  1.42<H>    Ca    9.1      05 Sep 2020 18:56    TPro  6.3  /  Alb  3.9  /  TBili  1.6<H>  /  DBili  x   /  AST  10  /  ALT  10  /  AlkPhos  65  09-05          Urinalysis Basic - ( 05 Sep 2020 19:05 )    Color: REDDISH / Appearance: TURBID / S.017 / pH: 7.5  Gluc: NEGATIVE / Ketone: NEGATIVE  / Bili: NEGATIVE / Urobili: NORMAL   Blood: LARGE / Protein: 600 / Nitrite: NEGATIVE   Leuk Esterase: LARGE / RBC: >50 / WBC >50   Sq Epi: MODERATE / Non Sq Epi: x / Bacteria: MANY          RADIOLOGY & ADDITIONAL TESTS:  Results Reviewed: Y  Imaging Personally Reviewed: Y  Electrocardiogram Personally Reviewed: Y    COORDINATION OF CARE:  Care Discussed with Consultants/Other Providers [Y/N]: Y  Prior or Outpatient Records Reviewed [Y/N]: Y PROGRESS NOTE:   Authored by Jaspal Kirkpatrick MD  Pager: [phone]; [facility] [phone]    Patient is a 89y old  Female who presents with a chief complaint of Pyelonephritis (05 Sep 2020 22:24)      SUBJECTIVE / OVERNIGHT EVENTS: No acute events overnight.    ADDITIONAL REVIEW OF SYSTEMS: Reports dysuria, urinary frequency, and mild R flank pain. Denies fever, chills, diaphoresis, abdominal pain, myalgia, headache, chest pain, dyspnea, and weakness.     MEDICATIONS  (STANDING):  cefTRIAXone   IVPB 1000 milliGRAM(s) IV Intermittent every 24 hours  heparin   Injectable 5000 Unit(s) SubCutaneous every 12 hours  influenza   Vaccine 0.5 milliLiter(s) IntraMuscular once  metoprolol tartrate 25 milliGRAM(s) Oral two times a day    MEDICATIONS  (PRN):  acetaminophen   Tablet .. 650 milliGRAM(s) Oral every 6 hours PRN Temp greater or equal to 38C (100.4F), Mild Pain (1 - 3)  senna 2 Tablet(s) Oral at bedtime PRN Constipation      CAPILLARY BLOOD GLUCOSE      POCT Blood Glucose.: 111 mg/dL (05 Sep 2020 18:43)    I&O's Summary      PHYSICAL EXAM:  Vital Signs Last 24 Hrs  T(C): 36.7 (06 Sep 2020 06:56), Max: 38.2 (05 Sep 2020 18:43)  T(F): 98.1 (06 Sep 2020 06:56), Max: 100.8 (05 Sep 2020 18:43)  HR: 81 (06 Sep 2020 06:56) (69 - 107)  BP: 137/92 (06 Sep 2020 06:56) (114/55 - 155/87)  BP(mean): --  RR: 17 (06 Sep 2020 06:56) (16 - 25)  SpO2: 100% (06 Sep 2020 06:56) (98% - 100%)    GENERAL: No acute distress, well-developed  HEAD:  Atraumatic, Normocephalic  EYES: EOMI, PERRLA, conjunctiva and sclera clear  NECK: Supple, no lymphadenopathy, no JVD  CHEST/LUNG: CTAB; No wheezes, rales, or rhonchi  HEART: Regular rate and rhythm; No murmurs, rubs, or gallops  ABDOMEN: Soft, mild tenderness over epigastrum, quadrants nonTTP, non-distended; normal bowel sounds, no organomegaly, R CVA TENDERNESS  EXTREMITIES:  2+ peripheral pulses b/l, No clubbing, cyanosis, or edema  NEUROLOGY: A&O x 3, no focal deficits  SKIN: No rashes or lesions    LABS:                        12.3   10.33 )-----------( 187      ( 05 Sep 2020 18:56 )             37.5     09-    130<L>  |  96<L>  |  22  ----------------------------<  118<H>  4.4   |  21<L>  |  1.42<H>    Ca    9.1      05 Sep 2020 18:56    TPro  6.3  /  Alb  3.9  /  TBili  1.6<H>  /  DBili  x   /  AST  10  /  ALT  10  /  AlkPhos  65  09-      Urinalysis Basic - ( 05 Sep 2020 19:05 )    Color: REDDISH / Appearance: TURBID / S.017 / pH: 7.5  Gluc: NEGATIVE / Ketone: NEGATIVE  / Bili: NEGATIVE / Urobili: NORMAL   Blood: LARGE / Protein: 600 / Nitrite: NEGATIVE   Leuk Esterase: LARGE / RBC: >50 / WBC >50   Sq Epi: MODERATE / Non Sq Epi: x / Bacteria: MANY      RADIOLOGY & ADDITIONAL TESTS:  Results Reviewed:   Imaging Personally Reviewed:   < from: Xray Chest 1 View- PORTABLE-Urgent (20 @ 21:35) >  FINDINGS:    Redemonstrated is elevation of left hemidiaphragm with right shift of the mediastinum as well as mild cardiomegaly.  Right basilar atelectasis. Remainder of the lungs are clear with no evidence of focal consolidation. No evidence of pneumothorax or large pleural effusion.  Redemonstrated degenerative changes of thoracic spine.    IMPRESSION:    Right basilar atelectasis with redemonstrated elevation of left hemidiaphragm but otherwise clear lungs.    < end of copied text >      Electrocardiogram Personally Reviewed:     COORDINATION OF CARE:  Care Discussed with Consultants/Other Providers [Y/N]:   Prior or Outpatient Records Reviewed [Y/N]:

## 2020-09-07 ENCOUNTER — TRANSCRIPTION ENCOUNTER (OUTPATIENT)
Age: 85
End: 2020-09-07

## 2020-09-07 LAB
CULTURE RESULTS: NO GROWTH — SIGNIFICANT CHANGE UP
SPECIMEN SOURCE: SIGNIFICANT CHANGE UP

## 2020-09-07 PROCEDURE — 99233 SBSQ HOSP IP/OBS HIGH 50: CPT | Mod: GC

## 2020-09-07 RX ORDER — HALOPERIDOL DECANOATE 100 MG/ML
0.5 INJECTION INTRAMUSCULAR EVERY 6 HOURS
Refills: 0 | Status: DISCONTINUED | OUTPATIENT
Start: 2020-09-07 | End: 2020-09-09

## 2020-09-07 RX ORDER — HALOPERIDOL DECANOATE 100 MG/ML
1 INJECTION INTRAMUSCULAR EVERY 6 HOURS
Refills: 0 | Status: DISCONTINUED | OUTPATIENT
Start: 2020-09-07 | End: 2020-09-07

## 2020-09-07 RX ORDER — HALOPERIDOL DECANOATE 100 MG/ML
2 INJECTION INTRAMUSCULAR ONCE
Refills: 0 | Status: DISCONTINUED | OUTPATIENT
Start: 2020-09-07 | End: 2020-09-07

## 2020-09-07 RX ADMIN — HALOPERIDOL DECANOATE 0.5 MILLIGRAM(S): 100 INJECTION INTRAMUSCULAR at 23:47

## 2020-09-07 RX ADMIN — HALOPERIDOL DECANOATE 0.5 MILLIGRAM(S): 100 INJECTION INTRAMUSCULAR at 17:45

## 2020-09-07 RX ADMIN — HALOPERIDOL DECANOATE 0.5 MILLIGRAM(S): 100 INJECTION INTRAMUSCULAR at 11:39

## 2020-09-07 RX ADMIN — CEFTRIAXONE 100 MILLIGRAM(S): 500 INJECTION, POWDER, FOR SOLUTION INTRAMUSCULAR; INTRAVENOUS at 18:20

## 2020-09-07 RX ADMIN — Medication 25 MILLIGRAM(S): at 05:25

## 2020-09-07 RX ADMIN — HEPARIN SODIUM 5000 UNIT(S): 5000 INJECTION INTRAVENOUS; SUBCUTANEOUS at 05:25

## 2020-09-07 NOTE — PROGRESS NOTE ADULT - PROBLEM SELECTOR PLAN 6
6 month history of bladder mass causing urinary frequency and incontinence. Concern for malignancy given age and smoking history. Per son, not pursuing workup further given risks with diagnostic/therapeutic procedure.   - Primafit for urinary incontinence  - Consider urology consult in daytime  - Clarify with son if patient is taking trospium, noted in [employer] but per son, does not take. 6 month history of bladder mass causing urinary frequency and incontinence. Concern for malignancy given age and smoking history. Per son, not pursuing workup further given risks with diagnostic/therapeutic procedure.   - Primafit for urinary incontinence  - Consider urology consult only if consistent with goals of care  - Clarify with son if patient is taking trospium, noted in [employer] but per son, does not take.

## 2020-09-07 NOTE — PROGRESS NOTE ADULT - PROBLEM SELECTOR PLAN 2
, T 100.8, RR 27 in ED with altered mental status. Improved w acetaminophen and ceftriaxone. Lactate WNL.   - S/p 500 cc bolus NS  - CXR notable only for mediastinal mass  - sepsis improving, c/w antibiotics as above, f/u culture speciation and sensitivities , T 100.8, RR 27 in ED with altered mental status. Improved w acetaminophen and ceftriaxone. Lactate WNL.   - S/p 500 cc bolus NS  - CXR notable only for mediastinal mass  - sepsis improving, c/w antibiotics as above, f/u culture speciation and sensitivities. if cultures unfortunately non-revealing, then will treat empirically.

## 2020-09-07 NOTE — PROGRESS NOTE ADULT - ATTENDING COMMENTS
Patient seen and examined, d/w Dr. Kirkpatrick, agree with above.     Patient appears paranoid/agitated today, warns provider to stay away from her. C/w enhanced supervision, attempt redirection/reorientation, if necessary for patient/staff safety can consider low dose antipsychotic prn. On empiric treatment for pyelonephritis, cultures unfortunately non-revealing, currently afebrile and without leukocytosis. Ultrasound showing b/l hydronephrosis and bladder mass (previously known). Palliative eval pending (patient with mediastinal mass and bladder mass, previously decided against further workup).

## 2020-09-07 NOTE — PROGRESS NOTE ADULT - PROBLEM SELECTOR PLAN 7
Cr 1.4 on baseline 1. No BUN elevation. Possibly 2/2 Bactrim use vs JACQUELIN  - Check urine lytes  - monitor Cr/UOP Cr 1.4 on baseline 1. No BUN elevation. Possibly 2/2 Bactrim use vs JACQUELIN  - Check urine lytes  - monitor Cr/UOP  - Cr downtrending today, FeNA 1.8% suggesting intrinsic etiology

## 2020-09-07 NOTE — PROGRESS NOTE ADULT - PROBLEM SELECTOR PLAN 8
Interval enlargement on June Chest CT. Possibly contributing to tachypnea. Per discussion with son, no further workup was being pursued.  - Follow-up goals of care with son and patient   - palliative C/s  - Likely outpatient f/u

## 2020-09-07 NOTE — PROGRESS NOTE ADULT - ASSESSMENT
89F PMHx bladder mass, recurrent UTIs, anterior mediastinal mass, a-fib (not on AC d/t hematuria), and HTN presents with urinary frequency, dysuria, and AMS, admitted with metabolic encephalopathy 2/2 UTI, likely pyelonephritis. On day 2 ceftriaxone, most likely delirious as agitation/confusion fluctuates and is not her baseline. Most likely due to underlying infection but exacerbated by sleep disturbance.

## 2020-09-07 NOTE — DISCHARGE NOTE NURSING/CASE MANAGEMENT/SOCIAL WORK - PATIENT PORTAL LINK FT
You can access the FollowMyHealth Patient Portal offered by [facility] by registering at the following website: [email or web address]. By joining [facility] FollowMyHealth portal, you will also be able to view your health information using other applications (apps) compatible with our system.

## 2020-09-07 NOTE — PROGRESS NOTE ADULT - SUBJECTIVE AND OBJECTIVE BOX
PROGRESS NOTE:   Authored by Jaspal Kirkpatrick MD  Pager: [phone]; [facility] [phone]    Patient is a 89y old  Female who presents with a chief complaint of Pyelonephritis (06 Sep 2020 09:30)      SUBJECTIVE / OVERNIGHT EVENTS: Patient was awake all night and noted to appear confused and restless. Placed on 1:1 and hit sitter. This morning patient refused morning labs and was uncooperative with physical examination.     ADDITIONAL REVIEW OF SYSTEMS: Reports dysuria (improving), confusion, anxiety, and tremor (feels it is due to stress not chills). Denies abdominal pain, headache, nausea, vomiting, abnormal bowel movements, subjective fever, chills, diaphoresis, chest pain, and dyspnea.     MEDICATIONS  (STANDING):  cefTRIAXone   IVPB 1000 milliGRAM(s) IV Intermittent every 24 hours  heparin   Injectable 5000 Unit(s) SubCutaneous every 12 hours  influenza  Vaccine (HIGH DOSE) 0.5 milliLiter(s) IntraMuscular once  melatonin 3 milliGRAM(s) Oral at bedtime  metoprolol tartrate 25 milliGRAM(s) Oral two times a day    MEDICATIONS  (PRN):  acetaminophen   Tablet .. 650 milliGRAM(s) Oral every 6 hours PRN Temp greater or equal to 38C (100.4F), Mild Pain (1 - 3)  haloperidol    Injectable 0.5 milliGRAM(s) IntraMuscular every 6 hours PRN Agitation  senna 2 Tablet(s) Oral at bedtime PRN Constipation      CAPILLARY BLOOD GLUCOSE        I&O's Summary    06 Sep 2020 07:01  -  07 Sep 2020 07:00  --------------------------------------------------------  IN: 0 mL / OUT: 450 mL / NET: -450 mL        PHYSICAL EXAM:  Vital Signs Last 24 Hrs  T(C): 36.8 (07 Sep 2020 05:26), Max: 37.6 (06 Sep 2020 13:45)  T(F): 98.3 (07 Sep 2020 05:26), Max: 99.7 (06 Sep 2020 13:45)  HR: 88 (07 Sep 2020 05:26) (84 - 89)  BP: 145/96 (07 Sep 2020 05:26) (134/76 - 149/82)  BP(mean): --  RR: 18 (07 Sep 2020 05:26) (18 - 19)  SpO2: 98% (07 Sep 2020 05:26) (98% - 100%)    GENERAL: No acute distress, well-developed  HEAD:  Atraumatic, Normocephalic  EYES: EOMI, PERRLA, conjunctiva and sclera clear  NECK: Supple, no lymphadenopathy, no JVD  CHEST/LUNG: CTAB; No wheezes, rales, or rhonchi  HEART: Regular rate and rhythm; No murmurs, rubs, or gallops  ABDOMEN: Soft, non-tender, non-distended; normal bowel sounds, no organomegaly  EXTREMITIES:  2+ peripheral pulses b/l, No clubbing, cyanosis, or edema  NEUROLOGY: A&O x 3, no focal deficits  SKIN: No rashes or lesions    LABS:                        13.4   6.41  )-----------( 189      ( 06 Sep 2020 11:25 )             42.1     09-06    132<L>  |  95<L>  |  23  ----------------------------<  125<H>  4.9   |  23  |  1.34<H>    Ca    9.3      06 Sep 2020 11:25  Phos  3.3     09-06  Mg     1.5     09-06    TPro  6.0  /  Alb  3.5  /  TBili  1.0  /  DBili  x   /  AST  19  /  ALT  15  /  AlkPhos  72  09-06    PT/INR - ( 06 Sep 2020 11:25 )   PT: 14.0 SEC;   INR: 1.24          PTT - ( 06 Sep 2020 11:25 )  PTT:29.5 SEC      Urinalysis Basic - ( 05 Sep 2020 19:05 )    Color: REDDISH / Appearance: TURBID / S.017 / pH: 7.5  Gluc: NEGATIVE / Ketone: NEGATIVE  / Bili: NEGATIVE / Urobili: NORMAL   Blood: LARGE / Protein: 600 / Nitrite: NEGATIVE   Leuk Esterase: LARGE / RBC: >50 / WBC >50   Sq Epi: MODERATE / Non Sq Epi: x / Bacteria: MANY        Culture - Blood (collected 05 Sep 2020 20:06)  Source: .Blood Blood-Venous  Preliminary Report (06 Sep 2020 21:01):    No growth to date.    Culture - Blood (collected 05 Sep 2020 20:06)  Source: .Blood Blood-Peripheral  Preliminary Report (06 Sep 2020 21:01):    No growth to date.        RADIOLOGY & ADDITIONAL TESTS:    Renal US completed, awaiting report    Results Reviewed: Y  Imaging Personally Reviewed: Y  Electrocardiogram Personally Reviewed: Y    COORDINATION OF CARE:  Care Discussed with Consultants/Other Providers [Y/N]: Y  Prior or Outpatient Records Reviewed [Y/N]: Y PROGRESS NOTE:   Authored by Jaspal Kirkpatrick MD  Pager: [phone]; [facility] [phone]    Patient is a 89y old  Female who presents with a chief complaint of Pyelonephritis (06 Sep 2020 09:30)      SUBJECTIVE / OVERNIGHT EVENTS: Patient was awake all night and noted to appear confused and restless. Placed on 1:1 and hit sitter. This morning patient refused morning labs and was uncooperative with physical examination.     ADDITIONAL REVIEW OF SYSTEMS: Reports dysuria (improving), confusion, anxiety, and tremor (feels it is due to stress not chills). Denies abdominal pain, headache, nausea, vomiting, abnormal bowel movements, subjective fever, chills, diaphoresis, chest pain, and dyspnea.     MEDICATIONS  (STANDING):  cefTRIAXone   IVPB 1000 milliGRAM(s) IV Intermittent every 24 hours  heparin   Injectable 5000 Unit(s) SubCutaneous every 12 hours  influenza  Vaccine (HIGH DOSE) 0.5 milliLiter(s) IntraMuscular once  melatonin 3 milliGRAM(s) Oral at bedtime  metoprolol tartrate 25 milliGRAM(s) Oral two times a day    MEDICATIONS  (PRN):  acetaminophen   Tablet .. 650 milliGRAM(s) Oral every 6 hours PRN Temp greater or equal to 38C (100.4F), Mild Pain (1 - 3)  haloperidol    Injectable 0.5 milliGRAM(s) IntraMuscular every 6 hours PRN Agitation  senna 2 Tablet(s) Oral at bedtime PRN Constipation      CAPILLARY BLOOD GLUCOSE        I&O's Summary    06 Sep 2020 07:01  -  07 Sep 2020 07:00  --------------------------------------------------------  IN: 0 mL / OUT: 450 mL / NET: -450 mL        PHYSICAL EXAM:  Vital Signs Last 24 Hrs  T(C): 36.8 (07 Sep 2020 05:26), Max: 37.6 (06 Sep 2020 13:45)  T(F): 98.3 (07 Sep 2020 05:26), Max: 99.7 (06 Sep 2020 13:45)  HR: 88 (07 Sep 2020 05:26) (84 - 89)  BP: 145/96 (07 Sep 2020 05:26) (134/76 - 149/82)  BP(mean): --  RR: 18 (07 Sep 2020 05:26) (18 - 19)  SpO2: 98% (07 Sep 2020 05:26) (98% - 100%)    GENERAL: Anixous, partially cooperative  HEAD:  Atraumatic, Normocephalic  EYES: EOMI, PERRLA, conjunctiva and sclera clear  NECK: Supple, no lymphadenopathy, no JVD  CHEST/LUNG: CTAB; No wheezes, rales, or rhonchi  HEART: Regular rate and rhythm; No murmurs, rubs, or gallops  ABDOMEN: Soft, non-tender, non-distended; normal bowel sounds, no organomegaly, unable to palpate for CVA tenderness due to patient refusal  EXTREMITIES:  2+ peripheral pulses b/l, No clubbing, cyanosis, or edema  NEUROLOGY: A&O x 2, no focal deficits  SKIN: No rashes or lesions    LABS:                        13.4   6.41  )-----------( 189      ( 06 Sep 2020 11:25 )             42.1     09-06    132<L>  |  95<L>  |  23  ----------------------------<  125<H>  4.9   |  23  |  1.34<H>    Ca    9.3      06 Sep 2020 11:25  Phos  3.3     09-06  Mg     1.5     09-06    TPro  6.0  /  Alb  3.5  /  TBili  1.0  /  DBili  x   /  AST  19  /  ALT  15  /  AlkPhos  72  09-06    PT/INR - ( 06 Sep 2020 11:25 )   PT: 14.0 SEC;   INR: 1.24          PTT - ( 06 Sep 2020 11:25 )  PTT:29.5 SEC      Urinalysis Basic - ( 05 Sep 2020 19:05 )    Color: REDDISH / Appearance: TURBID / S.017 / pH: 7.5  Gluc: NEGATIVE / Ketone: NEGATIVE  / Bili: NEGATIVE / Urobili: NORMAL   Blood: LARGE / Protein: 600 / Nitrite: NEGATIVE   Leuk Esterase: LARGE / RBC: >50 / WBC >50   Sq Epi: MODERATE / Non Sq Epi: x / Bacteria: MANY        Culture - Blood (collected 05 Sep 2020 20:06)  Source: .Blood Blood-Venous  Preliminary Report (06 Sep 2020 21:01):    No growth to date.    Culture - Blood (collected 05 Sep 2020 20:06)  Source: .Blood Blood-Peripheral  Preliminary Report (06 Sep 2020 21:01):    No growth to date.        RADIOLOGY & ADDITIONAL TESTS:    Renal US completed, awaiting report    Results Reviewed: Y  Imaging Personally Reviewed: Y  Electrocardiogram Personally Reviewed: Y    COORDINATION OF CARE:  Care Discussed with Consultants/Other Providers [Y/N]: Y  Prior or Outpatient Records Reviewed [Y/N]: Y PROGRESS NOTE:   Authored by Jaspal Kirkpatrick MD  Pager: [phone]; [facility] [phone]    Patient is a 89y old  Female who presents with a chief complaint of Pyelonephritis (06 Sep 2020 09:30)      SUBJECTIVE / OVERNIGHT EVENTS: Patient was awake all night and noted to appear confused and restless. Placed on 1:1 and hit sitter. This morning patient refused morning labs and was uncooperative with physical examination.     ADDITIONAL REVIEW OF SYSTEMS: Reports dysuria (improving), confusion, anxiety, and tremor (feels it is due to stress not chills). Denies abdominal pain, headache, nausea, vomiting, abnormal bowel movements, subjective fever, chills, diaphoresis, chest pain, and dyspnea.     MEDICATIONS  (STANDING):  cefTRIAXone   IVPB 1000 milliGRAM(s) IV Intermittent every 24 hours  heparin   Injectable 5000 Unit(s) SubCutaneous every 12 hours  influenza  Vaccine (HIGH DOSE) 0.5 milliLiter(s) IntraMuscular once  melatonin 3 milliGRAM(s) Oral at bedtime  metoprolol tartrate 25 milliGRAM(s) Oral two times a day    MEDICATIONS  (PRN):  acetaminophen   Tablet .. 650 milliGRAM(s) Oral every 6 hours PRN Temp greater or equal to 38C (100.4F), Mild Pain (1 - 3)  haloperidol    Injectable 0.5 milliGRAM(s) IntraMuscular every 6 hours PRN Agitation  senna 2 Tablet(s) Oral at bedtime PRN Constipation      CAPILLARY BLOOD GLUCOSE        I&O's Summary    06 Sep 2020 07:01  -  07 Sep 2020 07:00  --------------------------------------------------------  IN: 0 mL / OUT: 450 mL / NET: -450 mL        PHYSICAL EXAM:  Vital Signs Last 24 Hrs  T(C): 36.8 (07 Sep 2020 05:26), Max: 37.6 (06 Sep 2020 13:45)  T(F): 98.3 (07 Sep 2020 05:26), Max: 99.7 (06 Sep 2020 13:45)  HR: 88 (07 Sep 2020 05:26) (84 - 89)  BP: 145/96 (07 Sep 2020 05:26) (134/76 - 149/82)  BP(mean): --  RR: 18 (07 Sep 2020 05:26) (18 - 19)  SpO2: 98% (07 Sep 2020 05:26) (98% - 100%)    GENERAL: Anixous, partially cooperative  HEAD:  Atraumatic, Normocephalic  EYES: EOMI, PERRLA, conjunctiva and sclera clear  NECK: Supple, no lymphadenopathy, no JVD  CHEST/LUNG: CTAB; No wheezes, rales, or rhonchi  HEART: Regular rate and rhythm; No murmurs, rubs, or gallops  ABDOMEN: Soft, non-tender, non-distended; normal bowel sounds, no organomegaly, unable to palpate for CVA tenderness due to patient refusal  EXTREMITIES:  2+ peripheral pulses b/l, No clubbing, cyanosis, or edema  NEUROLOGY: A&O x 2, no focal deficits  SKIN: No rashes or lesions    LABS:                        13.4   6.41  )-----------( 189      ( 06 Sep 2020 11:25 )             42.1     09-06    132<L>  |  95<L>  |  23  ----------------------------<  125<H>  4.9   |  23  |  1.34<H>    Ca    9.3      06 Sep 2020 11:25  Phos  3.3     09-06  Mg     1.5     09-06    TPro  6.0  /  Alb  3.5  /  TBili  1.0  /  DBili  x   /  AST  19  /  ALT  15  /  AlkPhos  72  09-06    PT/INR - ( 06 Sep 2020 11:25 )   PT: 14.0 SEC;   INR: 1.24          PTT - ( 06 Sep 2020 11:25 )  PTT:29.5 SEC      Urinalysis Basic - ( 05 Sep 2020 19:05 )    Color: REDDISH / Appearance: TURBID / S.017 / pH: 7.5  Gluc: NEGATIVE / Ketone: NEGATIVE  / Bili: NEGATIVE / Urobili: NORMAL   Blood: LARGE / Protein: 600 / Nitrite: NEGATIVE   Leuk Esterase: LARGE / RBC: >50 / WBC >50   Sq Epi: MODERATE / Non Sq Epi: x / Bacteria: MANY        Culture - Blood (collected 05 Sep 2020 20:06)  Source: .Blood Blood-Venous  Preliminary Report (06 Sep 2020 21:01):    No growth to date.    Culture - Blood (collected 05 Sep 2020 20:06)  Source: .Blood Blood-Peripheral  Preliminary Report (06 Sep 2020 21:01):    No growth to date.        RADIOLOGY & ADDITIONAL TESTS:    Renal US   < from: US Kidney and Bladder (20 @ 19:20) >  FINDINGS:    Right kidney:  8.0 cm. 1.0 cm upper pole exophytic simple cyst. Moderate hydronephrosis. Extrarenal pelvis. No intrarenal calculus.    Left kidney:  8.8 cm. Mild hydronephrosis. Extrarenal pelvis. No calculus or mass.    Urinary bladder: Minimally distended. Lobulated intraluminal mass. Bladder calculi versus calcified portion of mass.    IMPRESSION:    Bilateral hydronephrosis, moderate on right, mild on left.    Lobulated bladder mass. Possible bladder calculi.    < end of copied text >    Results Reviewed: Y  Imaging Personally Reviewed: Y  Electrocardiogram Personally Reviewed: Y    COORDINATION OF CARE:  Care Discussed with Consultants/Other Providers [Y/N]: Y  Prior or Outpatient Records Reviewed [Y/N]: Y

## 2020-09-07 NOTE — PROGRESS NOTE ADULT - PROBLEM SELECTOR PLAN 3
Confused and disoriented at home and on initial presentation, rapidly improved with IVF and abx. No focal deficits. Likely metabolic encephalopathy from sepsis from UTI  - Poor sleep overnight and agitated today, AOx2, most likely delirium  - Melatonin 3mg QHS, Haldol 0.5mg IM for agitation

## 2020-09-07 NOTE — DISCHARGE NOTE NURSING/CASE MANAGEMENT/SOCIAL WORK - NSDCPNINST_GEN_ALL_CORE
Pt remains aox4, ambulating independently with no assistance required. Pt clinically stable, shows no s/s of acute distress. Pt's VS are WNL and pt exhibits no s/s of falls. Pt medically cleared for discharge, discharge education provided and IV removed. Safety maintained during hospital stay.

## 2020-09-07 NOTE — PROGRESS NOTE ADULT - PROBLEM SELECTOR PLAN 1
+UA, fever, +right CVA tenderness, AMS. History of Klebsiella and Proteus on urine culture, both sensitive to CTX. History of recurrent UTIs in setting of bladder.  - S/p 1 day of bactrim at home   - Day 2 ceftriaxone 1g IV daily, with improvement in fever/dysuria  - Renal US complete, awaiting report  - Patient afebrile, continue to monitor vitals and CBC (WBC WNL) +UA, fever, +right CVA tenderness, AMS. History of Klebsiella and Proteus on urine culture, both sensitive to CTX. History of recurrent UTIs in setting of bladder.  - S/p 1 day of bactrim at home   - Day 3 ceftriaxone 1g IV daily, with improvement in fever/dysuria  - Renal US complete, with b/l hydronephrosis with known bladder mass  - Patient afebrile, continue to monitor vitals and CBC (WBC WNL)

## 2020-09-08 DIAGNOSIS — G93.41 METABOLIC ENCEPHALOPATHY: ICD-10-CM

## 2020-09-08 DIAGNOSIS — Z51.5 ENCOUNTER FOR PALLIATIVE CARE: ICD-10-CM

## 2020-09-08 DIAGNOSIS — Z71.89 OTHER SPECIFIED COUNSELING: ICD-10-CM

## 2020-09-08 DIAGNOSIS — N39.0 URINARY TRACT INFECTION, SITE NOT SPECIFIED: ICD-10-CM

## 2020-09-08 DIAGNOSIS — R45.1 RESTLESSNESS AND AGITATION: ICD-10-CM

## 2020-09-08 DIAGNOSIS — N18.3 CHRONIC KIDNEY DISEASE, STAGE 3 (MODERATE): ICD-10-CM

## 2020-09-08 LAB
ALBUMIN SERPL ELPH-MCNC: 3.2 G/DL — LOW (ref 3.3–5)
ALP SERPL-CCNC: 66 U/L — SIGNIFICANT CHANGE UP (ref 40–120)
ALT FLD-CCNC: 15 U/L — SIGNIFICANT CHANGE UP (ref 4–33)
ANION GAP SERPL CALC-SCNC: 11 MMO/L — SIGNIFICANT CHANGE UP (ref 7–14)
AST SERPL-CCNC: 14 U/L — SIGNIFICANT CHANGE UP (ref 4–32)
BILIRUB SERPL-MCNC: 0.5 MG/DL — SIGNIFICANT CHANGE UP (ref 0.2–1.2)
BUN SERPL-MCNC: 30 MG/DL — HIGH (ref 7–23)
CALCIUM SERPL-MCNC: 9 MG/DL — SIGNIFICANT CHANGE UP (ref 8.4–10.5)
CHLORIDE SERPL-SCNC: 99 MMOL/L — SIGNIFICANT CHANGE UP (ref 98–107)
CO2 SERPL-SCNC: 26 MMOL/L — SIGNIFICANT CHANGE UP (ref 22–31)
CORTIS SERPL-MCNC: 21.3 UG/DL — HIGH (ref 2.7–18.4)
CREAT SERPL-MCNC: 1.4 MG/DL — HIGH (ref 0.5–1.3)
GLUCOSE SERPL-MCNC: 99 MG/DL — SIGNIFICANT CHANGE UP (ref 70–99)
HCT VFR BLD CALC: 34.1 % — LOW (ref 34.5–45)
HGB BLD-MCNC: 11 G/DL — LOW (ref 11.5–15.5)
MAGNESIUM SERPL-MCNC: 1.8 MG/DL — SIGNIFICANT CHANGE UP (ref 1.6–2.6)
MCHC RBC-ENTMCNC: 28.5 PG — SIGNIFICANT CHANGE UP (ref 27–34)
MCHC RBC-ENTMCNC: 32.3 % — SIGNIFICANT CHANGE UP (ref 32–36)
MCV RBC AUTO: 88.3 FL — SIGNIFICANT CHANGE UP (ref 80–100)
NRBC # FLD: 0 K/UL — SIGNIFICANT CHANGE UP (ref 0–0)
OSMOLALITY SERPL: 285 MOSMO/KG — SIGNIFICANT CHANGE UP (ref 275–295)
PHOSPHATE SERPL-MCNC: 3.5 MG/DL — SIGNIFICANT CHANGE UP (ref 2.5–4.5)
PLATELET # BLD AUTO: 189 K/UL — SIGNIFICANT CHANGE UP (ref 150–400)
PMV BLD: 10.2 FL — SIGNIFICANT CHANGE UP (ref 7–13)
POTASSIUM SERPL-MCNC: 4.2 MMOL/L — SIGNIFICANT CHANGE UP (ref 3.5–5.3)
POTASSIUM SERPL-SCNC: 4.2 MMOL/L — SIGNIFICANT CHANGE UP (ref 3.5–5.3)
PROT SERPL-MCNC: 4.9 G/DL — LOW (ref 6–8.3)
RBC # BLD: 3.86 M/UL — SIGNIFICANT CHANGE UP (ref 3.8–5.2)
RBC # FLD: 13.9 % — SIGNIFICANT CHANGE UP (ref 10.3–14.5)
SODIUM SERPL-SCNC: 136 MMOL/L — SIGNIFICANT CHANGE UP (ref 135–145)
TSH SERPL-MCNC: 3 UIU/ML — SIGNIFICANT CHANGE UP (ref 0.27–4.2)
WBC # BLD: 5.45 K/UL — SIGNIFICANT CHANGE UP (ref 3.8–10.5)
WBC # FLD AUTO: 5.45 K/UL — SIGNIFICANT CHANGE UP (ref 3.8–10.5)

## 2020-09-08 PROCEDURE — 99223 1ST HOSP IP/OBS HIGH 75: CPT | Mod: GC

## 2020-09-08 PROCEDURE — 99233 SBSQ HOSP IP/OBS HIGH 50: CPT | Mod: GC

## 2020-09-08 RX ORDER — QUETIAPINE FUMARATE 200 MG/1
12.5 TABLET, FILM COATED ORAL AT BEDTIME
Refills: 0 | Status: DISCONTINUED | OUTPATIENT
Start: 2020-09-08 | End: 2020-09-09

## 2020-09-08 RX ADMIN — HEPARIN SODIUM 5000 UNIT(S): 5000 INJECTION INTRAVENOUS; SUBCUTANEOUS at 05:59

## 2020-09-08 RX ADMIN — Medication 25 MILLIGRAM(S): at 18:53

## 2020-09-08 RX ADMIN — QUETIAPINE FUMARATE 12.5 MILLIGRAM(S): 200 TABLET, FILM COATED ORAL at 23:22

## 2020-09-08 RX ADMIN — HEPARIN SODIUM 5000 UNIT(S): 5000 INJECTION INTRAVENOUS; SUBCUTANEOUS at 18:53

## 2020-09-08 RX ADMIN — Medication 25 MILLIGRAM(S): at 06:04

## 2020-09-08 RX ADMIN — Medication 3 MILLIGRAM(S): at 23:22

## 2020-09-08 RX ADMIN — CEFTRIAXONE 100 MILLIGRAM(S): 500 INJECTION, POWDER, FOR SOLUTION INTRAMUSCULAR; INTRAVENOUS at 18:54

## 2020-09-08 NOTE — PROGRESS NOTE ADULT - PROBLEM SELECTOR PLAN 2
, T 100.8, RR 27 in ED with altered mental status. Improved w acetaminophen and ceftriaxone. Lactate WNL.   - S/p 500 cc bolus NS  - CXR notable only for mediastinal mass  - sepsis improving, c/w antibiotics as above, f/u culture speciation and sensitivities. if cultures unfortunately non-revealing, then will treat empirically. , T 100.8, RR 27 in ED with altered mental status. Improved w acetaminophen and ceftriaxone. Lactate WNL.   - S/p 500 cc bolus NS  - CXR notable only for mediastinal mass  - sepsis improving, c/w antibiotics as above, cultures without growth x4days

## 2020-09-08 NOTE — CONSULT NOTE ADULT - PROBLEM SELECTOR RECOMMENDATION 4
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 Not seeking further workup.

## 2020-09-08 NOTE — CONSULT NOTE ADULT - PROBLEM SELECTOR RECOMMENDATION 2
- On Rocephin 1g q24hrs x10 days (9/6) for UTI   - Urine cx NGTD (9/6)   - Patient has bladder mass which could be primary etiology of recurrent UTIs. Son wishes to avoid consant rehospitalizations for UTIs. Home Hospice introduced and son was agreeable to evaluation prior to dc. - On Rocephin 1g q24hrs x10 days (9/6) for UTI   - Urine cx NGTD (9/6)   - Patient has bladder mass which could be primary etiology of recurrent UTIs. Son wishes to avoid constant rehospitalization for UTIs. Home Hospice introduced and son was agreeable to evaluation prior to dc.

## 2020-09-08 NOTE — CONSULT NOTE ADULT - PROBLEM SELECTOR RECOMMENDATION 5
Currently she remains a full code. After speaking with patient's son, patient is aware of bladder mass and mediastinal mass. He has never had GOC conversations with his mom, which we encouraged him and his sister, Lyly to do. Will follow up with patient tomorrow to possibly complete MOLST form tomorrow.  - Home hospice eval in progress

## 2020-09-08 NOTE — PROGRESS NOTE ADULT - ASSESSMENT
89F PMHx bladder mass, recurrent UTIs, anterior mediastinal mass, a-fib (not on AC d/t hematuria), and HTN presents with urinary frequency, dysuria, and AMS, admitted with metabolic encephalopathy 2/2 UTI, likely pyelonephritis. On day 2 ceftriaxone, most likely delirious as agitation/confusion fluctuates and is not her baseline. Most likely due to underlying infection but exacerbated by sleep disturbance. 89F PMHx bladder mass, recurrent UTIs, anterior mediastinal mass, a-fib (not on AC d/t hematuria), and HTN presents with urinary frequency, dysuria, and AMS, admitted with metabolic encephalopathy 2/2 UTI, likely pyelonephritis. On day 4 ceftriaxone, delirium is resolved today and patient is clinically improved. As her delirium has been fluctuating, will continue to monitor and keep IV abx as she refuses PO meds when delirious.

## 2020-09-08 NOTE — PROGRESS NOTE ADULT - PROBLEM SELECTOR PLAN 1
+UA, fever, +right CVA tenderness, AMS. History of Klebsiella and Proteus on urine culture, both sensitive to CTX. History of recurrent UTIs in setting of bladder.  - S/p 1 day of bactrim at home   - Day 3 ceftriaxone 1g IV daily, with improvement in fever/dysuria  - Renal US complete, with b/l hydronephrosis with known bladder mass  - Patient afebrile, continue to monitor vitals and CBC (WBC WNL) +UA, fever, +right CVA tenderness, AMS. History of Klebsiella and Proteus on urine culture, both sensitive to CTX. History of recurrent UTIs in setting of bladder.  - S/p 1 day of bactrim at home   - Day 4 ceftriaxone 1g IV daily, with improvement in fever/dysuria  - Renal US complete, with b/l hydronephrosis with known bladder mass  - Patient afebrile, continue to monitor vitals and CBC (WBC WNL)

## 2020-09-08 NOTE — CONSULT NOTE ADULT - SUBJECTIVE AND OBJECTIVE BOX
HPI:  89F PMHx bladder mass, recurrent UTIs, anterior mediastinal mass, a-fib (not on AC d/t hematuria), and HTN presents with urinary frequency, dysuria, and AMS. Per discussion with patient's son, patient was confused, disoriented, and "out of it." At baseline, she is fully alert and oriented. For the past 6 months she has had urinary frequency to the extent of being unable to sleep, per discussion with the son, she has not undergone cystoscopy for this due to concerns about her age and she refuses a urinary catheter. For the past week she has had dysuria and urinary incontinence, and started treatment with Bactrim yesterday. Also reports recently noting pink urine. Per chart review of outpatient records, urine culture from July grew Klebsiella and Proteus. Denies subjective fevers, chills, cough, SOB, chest pain, nausea, vomiting, abdominal pain, diarrhea, constipation, fecal incontinence.    Notably, has recent chest CT showing interval enlargement of left pleural mass with mediastinal infiltration.  On arrival to ED, T 100.8, , /97, RR 27, SpO2 100%. UA was positive. Given 1g CTX, 500cc bolus IV NS, acetaminophen. (05 Sep 2020 22:24)  ================================================================================  INTERVAL EVENTS: Palliative consulted for GOC. She received 3 doses of Haldol PRN over last 24hours. She has a 1-1 for constant observation as she was constantly   - 9/8: Chart reviewed. Palliative team met with patient, Dr. Champagne (palliative fellow), Maren Johansen (Palliative SW) and Dr. Diaz (Palliative attending) at bedside. Patient was conversive, able to tell us why she came to hospital and how her urinary symptoms are improving. She states she lives by herself and was independent prior to admission, ambulating with walker/cane, son lives nearby. She lost her  3 years ago and they were  for 74 years. She has two children, Trenton and Lyly, who are involved. She deferred to Trenton to help make medical decisions and gave permission for us to call him (Trenton [phone]).     Palliative team spoke to son, Trenton, who expressed understanding of his mother's hospital course and current status. He stated that his mom is aware of her bladder mass and chest mass. It was decided that mom was not a candidate for further workup due to her advanced age of her bladder mass by urology. He states the only option would have been to "scrape the bladder which would reduce the size of the mass" but it wouldn't be curative. Family did not want to pursue that aggressive treatment. We introduced home hospice to him and he seemed interested in this service but also stated that his "mom refused help at home in the past and was very independent at home so he was also interested in rehab services". We explained differences in hospice benefit and restorative PT benefit. Trenton agreed to have mom evaluated for home hospice.   - Advanced directives: Discussed ACPs with son who stated he had never had GOC discussion with his mom in the past but he knows she would not have want CPR or intubation. Encouraged him and his sister, Lyly, to discuss GOC with mom, which he agreed to do.      PERTINENT PM/SXH:   Mediastinal mass  Bladder mass  Atrial fibrillation  Hyperlipidemia  Rib fracture, left, closed, initial encounter  Essential hypertension    Open right hip fracture, type I or II, with routine healing, subsequent encounter  No significant past surgical history    FAMILY HISTORY:  FH: type 2 diabetes: son      ITEMS NOT CHECKED ARE NOT PRESENT    SOCIAL HISTORY:   Significant other/partner:  [ x]  Children:  [x ]  Congregation/Spirituality:  Substance hx:  [ ]   Tobacco hx:  [ ]   Alcohol hx: [ ]   Home Opioid hx:  [ ] I-Stop Reference No:  Living Situation: [x ]Home  [ ]Long term care  [ ]Rehab [ ]Other    ADVANCE DIRECTIVES:    DNR  MOLST  [ ]  Living Will  [ ]   DECISION MAKER(s): Patient  [ ] Health Care Proxy(s)  [ ] Surrogate(s)  [ ] Guardian           Name(s): Phone Number(s):    BASELINE (I)ADL(s) (prior to admission):  [place]: [ ]Total  [ ] Moderate [ ]Dependent    Allergies    No Known Allergies    Intolerances    Advil (Stomach Upset)  MEDICATIONS  (STANDING):  cefTRIAXone   IVPB 1000 milliGRAM(s) IV Intermittent every 24 hours  heparin   Injectable 5000 Unit(s) SubCutaneous every 12 hours  influenza  Vaccine (HIGH DOSE) 0.5 milliLiter(s) IntraMuscular once  melatonin 3 milliGRAM(s) Oral at bedtime  metoprolol tartrate 25 milliGRAM(s) Oral two times a day    MEDICATIONS  (PRN):  acetaminophen   Tablet .. 650 milliGRAM(s) Oral every 6 hours PRN Temp greater or equal to 38C (100.4F), Mild Pain (1 - 3)  haloperidol    Injectable 0.5 milliGRAM(s) IntraMuscular every 6 hours PRN Agitation  senna 2 Tablet(s) Oral at bedtime PRN Constipation      PRESENT SYMPTOMS: [ ]Unable to obtain due to poor mentation   Source if other than patient:  [ ]Family   [ ]Team     Pain (Impact on QOL):    Location -         Minimal acceptable level (0-10 scale):                    Aggravating factors -  Quality -  Radiation -  Severity (0-10 scale) -    Timing -    PAIN AD Score:     [email or web address] (press ctrl +  left click to view)    Dyspnea:                           [ ]Mild [ ]Moderate [ ]Severe  Anxiety:                             [ ]Mild [ ]Moderate [ ]Severe  Fatigue:                             [ ]Mild [ ]Moderate [ ]Severe  Nausea:                             [ ]Mild [ ]Moderate [ ]Severe  Loss of appetite:              [ ]Mild [ ]Moderate [ ]Severe  Constipation:                    [ ]Mild [ ]Moderate [ ]Severe    Other Symptoms:  [ ]All other review of systems negative     Karnofsky Performance Score/Palliative Performance Status Version 2:         %    http://palliative.info/resource_material/[email or web address].pdf    PHYSICAL EXAM:  Vital Signs Last 24 Hrs  T(C): 36.7 (08 Sep 2020 05:56), Max: 36.7 (07 Sep 2020 22:13)  T(F): 98.1 (08 Sep 2020 05:56), Max: 98.1 (07 Sep 2020 22:13)  HR: 84 (08 Sep 2020 05:56) (83 - 84)  BP: 158/68 (08 Sep 2020 05:56) (132/66 - 158/68)  BP(mean): --  RR: 17 (08 Sep 2020 05:56) (17 - 17)  SpO2: 97% (08 Sep 2020 05:56) (96% - 97%) I&O's Summary  GENERAL:  [ ]Alert  [ ]Oriented x   [ ]Lethargic  [ ]Cachexia  [ ]Unarousable  [ ]Verbal  [ ]Non-Verbal  Behavioral:   [ ] Anxiety  [ ] Delirium [ ] Agitation [ ] Other  HEENT:  [ ]Normal   [ ]Dry mouth   [ ]ET Tube/Trach  [ ]Oral lesions  PULMONARY:   [ ]Clear [ ]Tachypnea  [ ]Audible excessive secretions   [ ]Rhonchi        [ ]Right [ ]Left [ ]Bilateral  [ ]Crackles        [ ]Right [ ]Left [ ]Bilateral  [ ]Wheezing     [ ]Right [ ]Left [ ]Bilateral  CARDIOVASCULAR:    [ ]Regular [ ]Irregular [ ]Tachy  [ ]Magdiel [ ]Murmur [ ]Other  GASTROINTESTINAL:  [ ]Soft  [ ]Distended   [ ]+BS  [ ]Non tender [ ]Tender  [ ]PEG [ ]OGT/ NGT  Last BM:   GENITOURINARY:  [ ]Normal [ ] Incontinent   [ ]Oliguria/Anuria   [ ]Cole  MUSCULOSKELETAL:   [ ]Normal   [ ]Weakness  [ ]Bed/Wheelchair bound [ ]Edema  NEUROLOGIC:   [ ]No focal deficits  [ ] Cognitive impairment  [ ] Dysphagia [ ]Dysarthria [ ] Paresis [ ]Other   SKIN:   [ ]Normal   [ ]Pressure ulcer(s)  [ ]Rash    CRITICAL CARE:  [ ] Shock Present  [ ]Septic [ ]Cardiogenic [ ]Neurologic [ ]Hypovolemic  [ ]  Vasopressors [ ]  Inotropes   [ ] Respiratory failure present  [ ] Acute  [ ] Chronic [ ] Hypoxic  [ ] Hypercarbic [ ] Other  [ ] Other organ failure     GRIEF  [ ] Yes  [ ] No    LABS:                        11.0   5.45  )-----------( 189      ( 08 Sep 2020 07:25 )             34.1   09-08    136  |  99  |  30<H>  ----------------------------<  99  4.2   |  26  |  1.40<H>    Ca    9.0      08 Sep 2020 07:25  Phos  3.5     09-08  Mg     1.8     09-08    TPro  4.9<L>  /  Alb  3.2<L>  /  TBili  0.5  /  DBili  x   /  AST  14  /  ALT  15  /  AlkPhos  66  09-08        RADIOLOGY & ADDITIONAL STUDIES:    PROTEIN CALORIE MALNUTRITION PRESENT: [ ] Yes [ ] No  [ ] PPSV2 < or = to 30% [ ] significant weight loss  [ ] poor nutritional intake [ ] catabolic state [ ] anasarca     Albumin, Serum: 3.2 g/dL (09-08-20 @ 07:25)  Artificial Nutrition [ ]     REFERRALS:   [ ]Chaplaincy  [ ] Hospice  [ ]Child Life  [ ]Social Work  [ ]Case management [ ]Holistic Therapy   Goals of Care Discussion Document: HPI:  89F PMHx bladder mass, recurrent UTIs, anterior mediastinal mass, a-fib (not on AC d/t hematuria), and HTN presents with urinary frequency, dysuria, and AMS. Per discussion with patient's son, patient was confused, disoriented, and "out of it." At baseline, she is fully alert and oriented. For the past 6 months she has had urinary frequency to the extent of being unable to sleep, per discussion with the son, she has not undergone cystoscopy for this due to concerns about her age and she refuses a urinary catheter. For the past week she has had dysuria and urinary incontinence, and started treatment with Bactrim yesterday. Also reports recently noting pink urine. Per chart review of outpatient records, urine culture from July grew Klebsiella and Proteus. Denies subjective fevers, chills, cough, SOB, chest pain, nausea, vomiting, abdominal pain, diarrhea, constipation, fecal incontinence.    Notably, has recent chest CT showing interval enlargement of left pleural mass with mediastinal infiltration.  On arrival to ED, T 100.8, , /97, RR 27, SpO2 100%. UA was positive. Given 1g CTX, 500cc bolus IV NS, acetaminophen. (05 Sep 2020 22:24)  ================================================================================  INTERVAL EVENTS: Palliative consulted for GOC. She received 3 doses of Haldol PRN over last 24hours. She has a 1-1 for constant observation as she was constantly   - 9/8: Chart reviewed. Palliative team met with patient, Dr. Champagne (palliative fellow), Maren Johansen (Palliative SW) and Dr. Diaz (Palliative attending) at bedside. Patient was conversive, able to tell us why she came to hospital and how her urinary symptoms are improving. She states she lives by herself and was independent prior to admission, ambulating with walker/cane, son lives nearby. She lost her  3 years ago and they were  for 74 years. She has two children, Trenton and Lyly, who are involved. She deferred to Trenton to help make medical decisions and gave permission for us to call him (Trenton [phone]) but states she has never designated a HCP. She states both her children can make decisions for her.     Palliative team spoke to son, Trenton, who expressed understanding of his mother's hospital course and current status. He stated that his mom is aware of her bladder mass and chest mass. It was decided that mom was not a candidate for further workup due to her advanced age of her bladder mass by urology. He states the only option would have been to "scrape the bladder which would reduce the size of the mass" but it wouldn't be curative. Family did not want to pursue that aggressive treatment. We introduced home hospice to him and he seemed interested in this service but also stated that his "mom refused help at home in the past and was very independent at home so he was also interested in rehab services". We explained differences in hospice benefit and restorative PT benefit. Trenton agreed to have mom evaluated for home hospice.   - Advanced directives: Discussed ACPs with son who stated he had never had GOC discussion with his mom in the past but he knows she would not have want CPR or intubation. Encouraged him and his sister, Lyly, to discuss GOC with mom, which he agreed to do.      PERTINENT PM/SXH:   Mediastinal mass  Bladder mass  Atrial fibrillation  Hyperlipidemia  Rib fracture, left, closed, initial encounter  Essential hypertension    Open right hip fracture, type I or II, with routine healing, subsequent encounter  No significant past surgical history    FAMILY HISTORY:  FH: type 2 diabetes: son      ITEMS NOT CHECKED ARE NOT PRESENT    SOCIAL HISTORY:   Significant other/partner:  [ x]  Children:  [x ]  Anglican/Spirituality:  Substance hx:  [ ]   Tobacco hx:  [ ]   Alcohol hx: [ ]   Home Opioid hx:  [ ] I-Stop Reference No:  Living Situation: [x ]Home  [ ]Long term care  [ ]Rehab [ ]Other    ADVANCE DIRECTIVES:    DNR  MOLST  [ ]  Living Will  [ ]   DECISION MAKER(s): Patient  [ ] Health Care Proxy(s)  [x ] Surrogate(s)  [ ] Guardian           Name(s): Trenton (son) and Lyly (daughter) Phone Number(s): Trenton [phone]    BASELINE (I)ADL(s) (prior to admission):  [place]: [x ]Total  [ ] Moderate [ ]Dependent    Allergies    No Known Allergies    Intolerances    Advil (Stomach Upset)  MEDICATIONS  (STANDING):  cefTRIAXone   IVPB 1000 milliGRAM(s) IV Intermittent every 24 hours  heparin   Injectable 5000 Unit(s) SubCutaneous every 12 hours  influenza  Vaccine (HIGH DOSE) 0.5 milliLiter(s) IntraMuscular once  melatonin 3 milliGRAM(s) Oral at bedtime  metoprolol tartrate 25 milliGRAM(s) Oral two times a day    MEDICATIONS  (PRN):  acetaminophen   Tablet .. 650 milliGRAM(s) Oral every 6 hours PRN Temp greater or equal to 38C (100.4F), Mild Pain (1 - 3)  haloperidol    Injectable 0.5 milliGRAM(s) IntraMuscular every 6 hours PRN Agitation  senna 2 Tablet(s) Oral at bedtime PRN Constipation      PRESENT SYMPTOMS: [ ]Unable to obtain due to poor mentation   Source if other than patient:  [ ]Family   [ ]Team     Pain (Impact on QOL):  denied  Location -         Minimal acceptable level (0-10 scale):                    Aggravating factors -  Quality -  Radiation -  Severity (0-10 scale) -    Timing -    PAIN AD Score:     [email or web address] (press ctrl +  left click to view)    Dyspnea:                           [ ]Mild [ ]Moderate [ ]Severe  Anxiety:                             [ ]Mild [ ]Moderate [ ]Severe  Fatigue:                             [ ]Mild [ ]Moderate [ ]Severe  Nausea:                             [ ]Mild [ ]Moderate [ ]Severe  Loss of appetite:              [ ]Mild [ ]Moderate [ ]Severe  Constipation:                    [ ]Mild [ ]Moderate [ ]Severe    Other Symptoms:  [ ]All other review of systems negative     Karnofsky Performance Score/Palliative Performance Status Version 2:    60-70     %    http://palliative.info/resource_material/[email or web address].pdf    PHYSICAL EXAM:  Vital Signs Last 24 Hrs  T(C): 36.7 (08 Sep 2020 05:56), Max: 36.7 (07 Sep 2020 22:13)  T(F): 98.1 (08 Sep 2020 05:56), Max: 98.1 (07 Sep 2020 22:13)  HR: 84 (08 Sep 2020 05:56) (83 - 84)  BP: 158/68 (08 Sep 2020 05:56) (132/66 - 158/68)  BP(mean): --  RR: 17 (08 Sep 2020 05:56) (17 - 17)  SpO2: 97% (08 Sep 2020 05:56) (96% - 97%) I&O's Summary  GENERAL: appears younger than stated age  [x ]Alert  [x ]Oriented x3   [ ]Lethargic  [ ]Cachexia  [ ]Unarousable  [ x]Verbal  [ ]Non-Verbal  Behavioral:   [ ] Anxiety  [ ] Delirium [ ] Agitation [ ] Other  HEENT:  [x ]Normal   [ ]Dry mouth   [ ]ET Tube/Trach  [ ]Oral lesions  PULMONARY:   [x ]Clear [ ]Tachypnea  [ ]Audible excessive secretions   [ ]Rhonchi        [ ]Right [ ]Left [ ]Bilateral  [ ]Crackles        [ ]Right [ ]Left [ ]Bilateral  [ ]Wheezing     [ ]Right [ ]Left [ ]Bilateral  CARDIOVASCULAR:    [ x]Regular [ ]Irregular [ ]Tachy  [ ]Magdiel [ ]Murmur [ ]Other  GASTROINTESTINAL:  [ x]Soft  [ ]Distended   [ ]+BS  [ ]Non tender [ ]Tender  [ ]PEG [ ]OGT/ NGT  Last BM:   GENITOURINARY:  [x ]Normal [ ] Incontinent   [ ]Oliguria/Anuria   [ ]Cole  MUSCULOSKELETAL:   [ ]Normal   [x ]Weakness  [ ]Bed/Wheelchair bound [ ]Edema  NEUROLOGIC:   [x ]No focal deficits  [ ] Cognitive impairment  [ ] Dysphagia [ ]Dysarthria [ ] Paresis [ ]Other   SKIN:   [x ]Normal   [ ]Pressure ulcer(s)  [ ]Rash    CRITICAL CARE:  [ ] Shock Present  [ ]Septic [ ]Cardiogenic [ ]Neurologic [ ]Hypovolemic  [ ]  Vasopressors [ ]  Inotropes   [ ] Respiratory failure present  [ ] Acute  [ ] Chronic [ ] Hypoxic  [ ] Hypercarbic [ ] Other  [ ] Other organ failure     GRIEF  [ ] Yes  [ ] No    LABS:                        11.0   5.45  )-----------( 189      ( 08 Sep 2020 07:25 )             34.1   09-08    136  |  99  |  30<H>  ----------------------------<  99  4.2   |  26  |  1.40<H>    Ca    9.0      08 Sep 2020 07:25  Phos  3.5     09-08  Mg     1.8     09-08    TPro  4.9<L>  /  Alb  3.2<L>  /  TBili  0.5  /  DBili  x   /  AST  14  /  ALT  15  /  AlkPhos  66  09-08        RADIOLOGY & ADDITIONAL STUDIES:  < from: US Kidney and Bladder (09.06.20 @ 19:20) >  IMPRESSION:    Bilateral hydronephrosis, moderate on right, mild on left.    Lobulated bladder mass. Possible bladder calculi.    < end of copied text >      PROTEIN CALORIE MALNUTRITION PRESENT: [ ] Yes [ ] No  [ ] PPSV2 < or = to 30% [ ] significant weight loss  [ ] poor nutritional intake [ ] catabolic state [ ] anasarca     Albumin, Serum: 3.2 g/dL (09-08-20 @ 07:25)  Artificial Nutrition [ ]     REFERRALS:   [ ]Chaplaincy  [ x] Hospice  [ ]Child Life  [ ]Social Work  [ ]Case management [ ]Holistic Therapy   Goals of Care Discussion Document: HPI:  89F PMHx bladder mass, recurrent UTIs, anterior mediastinal mass, a-fib (not on AC d/t hematuria), and HTN presents with urinary frequency, dysuria, and AMS. Per discussion with patient's son, patient was confused, disoriented, and "out of it." At baseline, she is fully alert and oriented. For the past 6 months she has had urinary frequency to the extent of being unable to sleep, per discussion with the son, she has not undergone cystoscopy for this due to concerns about her age and she refuses a urinary catheter. For the past week she has had dysuria and urinary incontinence, and started treatment with Bactrim yesterday. Also reports recently noting pink urine. Per chart review of outpatient records, urine culture from July grew Klebsiella and Proteus. Denies subjective fevers, chills, cough, SOB, chest pain, nausea, vomiting, abdominal pain, diarrhea, constipation, fecal incontinence.    Notably, has recent chest CT showing interval enlargement of left pleural mass with mediastinal infiltration.  On arrival to ED, T 100.8, , /97, RR 27, SpO2 100%. UA was positive. Given 1g CTX, 500cc bolus IV NS, acetaminophen. (05 Sep 2020 22:24)  ================================================================================  INTERVAL EVENTS: Palliative consulted for GOC. She received 3 doses of Haldol PRN over last 24hours. She has a 1-1 for constant observation as she was constantly   - 9/8: Chart reviewed. Palliative team met with patient, Dr. Champagne (palliative fellow), Maren Johansen (Palliative SW) and Dr. Diaz (Palliative attending) at bedside. Patient was conversive, able to tell us why she came to hospital and how her urinary symptoms are improving. She states she lives by herself and was independent prior to admission, ambulating with walker/cane, son lives nearby. She lost her  3 years ago and they were  for 74 years. She has two children, Trenton and Lyly, who are involved. She deferred to Trenton to help make medical decisions and gave permission for us to call him (Trenton [phone]) but states she has never designated a HCP. She states both her children can make decisions for her.     Palliative team spoke to son, Trenton, who expressed understanding of his mother's hospital course and current status. He stated that his mom is aware of her bladder mass and chest mass. It was decided that mom was not a candidate for further workup due to her advanced age of her bladder mass by urology. He states the only option would have been to "scrape the bladder which would reduce the size of the mass" but it wouldn't be curative. Family did not want to pursue that aggressive treatment. We introduced home hospice to him and he seemed interested in this service but also stated that his "mom refused help at home in the past and was very independent at home so he was also interested in rehab services". We explained differences in hospice benefit and restorative PT benefit. Trenton agreed to have mom evaluated for home hospice.   - Advanced directives: Discussed ACPs with son who stated he had never had GOC discussion with his mom in the past but he knows she would not have want CPR or intubation. Encouraged him and his sister, Lyly, to discuss GOC with mom, which he agreed to do.      PERTINENT PM/SXH:   Mediastinal mass  Bladder mass  Atrial fibrillation  Hyperlipidemia  Rib fracture, left, closed, initial encounter  Essential hypertension    Open right hip fracture, type I or II, with routine healing, subsequent encounter  No significant past surgical history    FAMILY HISTORY:  FH: type 2 diabetes: son    ITEMS NOT CHECKED ARE NOT PRESENT    SOCIAL HISTORY:   Significant other/partner:  [ x]  Children:  [x ]  Caodaism/Spirituality:  Substance hx:  [ ]   Tobacco hx:  [ ]   Alcohol hx: [ ]   Home Opioid hx:  [ ] I-Stop Reference No:  Living Situation: [x ]Home  [ ]Long term care  [ ]Rehab [ ]Other    ADVANCE DIRECTIVES:    DNR  MOLST  [ ]  Living Will  [ ]   DECISION MAKER(s): Patient  [ ] Health Care Proxy(s)  [x ] Surrogate(s)  [ ] Guardian           Name(s): Trenton (son) and Lyly (daughter) Phone Number(s): Trenton [phone]    BASELINE (I)ADL(s) (prior to admission):  [place]: [x ]Total  [ ] Moderate [ ]Dependent    Allergies    No Known Allergies    Intolerances    Advil (Stomach Upset)  MEDICATIONS  (STANDING):  cefTRIAXone   IVPB 1000 milliGRAM(s) IV Intermittent every 24 hours  heparin   Injectable 5000 Unit(s) SubCutaneous every 12 hours  influenza  Vaccine (HIGH DOSE) 0.5 milliLiter(s) IntraMuscular once  melatonin 3 milliGRAM(s) Oral at bedtime  metoprolol tartrate 25 milliGRAM(s) Oral two times a day    MEDICATIONS  (PRN):  acetaminophen   Tablet .. 650 milliGRAM(s) Oral every 6 hours PRN Temp greater or equal to 38C (100.4F), Mild Pain (1 - 3)  haloperidol    Injectable 0.5 milliGRAM(s) IntraMuscular every 6 hours PRN Agitation  senna 2 Tablet(s) Oral at bedtime PRN Constipation    PRESENT SYMPTOMS: [ ]Unable to obtain due to poor mentation   Source if other than patient:  [ ]Family   [ ]Team     Pain (Impact on QOL):  denied  Location -         Minimal acceptable level (0-10 scale):                    Aggravating factors -  Quality -  Radiation -  Severity (0-10 scale) -    Timing -    PAIN AD Score:     [email or web address]/[email or web address] (press ctrl +  left click to view)    Dyspnea:                           [ ]Mild [ ]Moderate [ ]Severe  Anxiety:                             [ ]Mild [ ]Moderate [ ]Severe  Fatigue:                             [ ]Mild [ ]Moderate [ ]Severe  Nausea:                             [ ]Mild [ ]Moderate [ ]Severe  Loss of appetite:              [ ]Mild [ ]Moderate [ ]Severe  Constipation:                    [ ]Mild [ ]Moderate [ ]Severe    Other Symptoms:  [x ]All other review of systems negative     Karnofsky Performance Score/Palliative Performance Status Version 2:    60-70     %    http://palliative.info/resource_material/[email or web address].pdf    PHYSICAL EXAM:  Vital Signs Last 24 Hrs  T(C): 36.7 (08 Sep 2020 05:56), Max: 36.7 (07 Sep 2020 22:13)  T(F): 98.1 (08 Sep 2020 05:56), Max: 98.1 (07 Sep 2020 22:13)  HR: 84 (08 Sep 2020 05:56) (83 - 84)  BP: 158/68 (08 Sep 2020 05:56) (132/66 - 158/68)  BP(mean): --  RR: 17 (08 Sep 2020 05:56) (17 - 17)  SpO2: 97% (08 Sep 2020 05:56) (96% - 97%) I&O's Summary    GENERAL: appears younger than stated age  [x ]Alert  [x ]Oriented x3   [ ]Lethargic  [ ]Cachexia  [ ]Unarousable  [ x]Verbal  [ ]Non-Verbal  Behavioral:   [ ] Anxiety  [ ] Delirium [ ] Agitation [ ] Other  HEENT:  [x ]Normal   [ ]Dry mouth   [ ]ET Tube/Trach  [ ]Oral lesions  PULMONARY:   [x ]Clear [ ]Tachypnea  [ ]Audible excessive secretions   [ ]Rhonchi        [ ]Right [ ]Left [ ]Bilateral  [ ]Crackles        [ ]Right [ ]Left [ ]Bilateral  [ ]Wheezing     [ ]Right [ ]Left [ ]Bilateral  CARDIOVASCULAR:    [ x]Regular [ ]Irregular [ ]Tachy  [ ]Magdiel [ ]Murmur [ ]Other  GASTROINTESTINAL:  [ x]Soft  [ ]Distended   [ ]+BS  [ ]Non tender [ ]Tender  [ ]PEG [ ]OGT/ NGT  Last BM: 9/7  GENITOURINARY:  [x ]Normal [ ] Incontinent   [ ]Oliguria/Anuria   [ ]Cole  MUSCULOSKELETAL:   [ ]Normal   [x ]Weakness  [ ]Bed/Wheelchair bound [ ]Edema  NEUROLOGIC:   [x ]No focal deficits  [ ] Cognitive impairment  [ ] Dysphagia [ ]Dysarthria [ ] Paresis [ ]Other   SKIN:   [x ]Normal   [ ]Pressure ulcer(s)  [ ]Rash    CRITICAL CARE:  [ ] Shock Present  [ ]Septic [ ]Cardiogenic [ ]Neurologic [ ]Hypovolemic  [ ]  Vasopressors [ ]  Inotropes   [ ] Respiratory failure present  [ ] Acute  [ ] Chronic [ ] Hypoxic  [ ] Hypercarbic [ ] Other  [ ] Other organ failure     GRIEF  [ ] Yes  [ x] No    LABS:                        11.0   5.45  )-----------( 189      ( 08 Sep 2020 07:25 )             34.1   09-08    136  |  99  |  30<H>  ----------------------------<  99  4.2   |  26  |  1.40<H>    Ca    9.0      08 Sep 2020 07:25  Phos  3.5     09-08  Mg     1.8     09-08    TPro  4.9<L>  /  Alb  3.2<L>  /  TBili  0.5  /  DBili  x   /  AST  14  /  ALT  15  /  AlkPhos  66  09-08    RADIOLOGY & ADDITIONAL STUDIES:  < from: US Kidney and Bladder (09.06.20 @ 19:20) >  IMPRESSION:  Bilateral hydronephrosis, moderate on right, mild on left.  Lobulated bladder mass. Possible bladder calculi.    PROTEIN CALORIE MALNUTRITION PRESENT: [ ] Yes [ ] No  [ ] PPSV2 < or = to 30% [ ] significant weight loss  [ ] poor nutritional intake [ ] catabolic state [ ] anasarca     Albumin, Serum: 3.2 g/dL (09-08-20 @ 07:25)  Artificial Nutrition [ ]     REFERRALS:   [ ]Chaplaincy  [ x] Hospice  [ ]Child Life  [ ]Social Work  [ ]Case management [ ]Holistic Therapy   Goals of Care Discussion Document:

## 2020-09-08 NOTE — PROGRESS NOTE ADULT - PROBLEM SELECTOR PLAN 3
Confused and disoriented at home and on initial presentation, rapidly improved with IVF and abx. No focal deficits. Likely metabolic encephalopathy from sepsis from UTI  - Poor sleep overnight and agitated today, AOx2, most likely delirium  - Melatonin 3mg QHS, Haldol 0.5mg IM for agitation Confused and disoriented at home and on initial presentation, rapidly improved with IVF and abx. No focal deficits. Likely metabolic encephalopathy from sepsis from UTI  - Poor sleep overnight and agitated yesterday, AOx2, most likely delirium  - Today, reports improved sleep and AOx3, at baseline  - Melatonin 3mg QHS, Haldol 0.5mg IM for agitation

## 2020-09-08 NOTE — PROGRESS NOTE ADULT - PROBLEM SELECTOR PLAN 8
Interval enlargement on June Chest CT. Possibly contributing to tachypnea. Per discussion with son, no further workup was being pursued.  - Follow-up goals of care with son and patient   - palliative C/s  - Likely outpatient f/u Interval enlargement on June Chest CT. Possibly contributing to tachypnea. Per discussion with son, no further workup was being pursued.  - Follow-up goals of care with son and patient   - palliative on board  - Likely outpatient f/u

## 2020-09-08 NOTE — CONSULT NOTE ADULT - ASSESSMENT
89F PMHx bladder mass, recurrent UTIs, anterior mediastinal mass, a-fib (not on AC d/t hematuria), and HTN presented with urinary frequency, dysuria, and AMS, admitted with metabolic encephalopathy 2/2 UTI, likely pyelonephritis.   Course has been c/b delirium from sundowning and during these episodes patient refuses medications and requires haldol. Palliative consulted for GOC.

## 2020-09-08 NOTE — GOALS OF CARE CONVERSATION - ADVANCED CARE PLANNING - CONVERSATION DETAILS
HOSPICE CARE NETWORK    Referral for evaluation for hospice care received.   Documentation reviewed.     HCN MD reviewing information. Await decision from Hospice MD re pt's eligibility for home hospice care under Medicare guidelines.     HCN RN continues to follow and will follow up with Palliative Care Team and  on Wed 9/9/2020

## 2020-09-08 NOTE — CONSULT NOTE ADULT - PROBLEM SELECTOR RECOMMENDATION 9
Agitation 2/2 likely sundowning vs. possible AMS from UTI   - Start seroquel 12.5mg PO qhs   - Continue haldol 0.5mg IM qhrs prn agitation   - Constant observation as patient is a fall risk   - Continue melatonin 3mg PO qhs Agitation 2/2 likely sundowning vs. possible AMS from UTI   - Start Seroquel 12.5mg PO qhs   - Continue haldol 0.5mg IM prn agitation   - Constant observation per primary team as patient is a fall risk   - Continue melatonin 3mg PO qhs

## 2020-09-08 NOTE — PROGRESS NOTE ADULT - ATTENDING COMMENTS
89F PMHx bladder mass, recurrent UTIs, anterior mediastinal mass, Afib (not on AC 2/2 hematuria), and HTN admitted with metabolic encephalopathy 2/2 UTI, likely pyelonephritis.   -C/w CTX empirically for now as patient has clinically improved, unfortunately urine culture sent 6 hours after starting IV abx in ED and showed no growth  -F/u blood cultures, NGTD  -Likely transition to PO cephalosporin to complete 7 days of abx upon d/c  -PT recommending home PT  -Palliative care consulted as patient appears to have likely metastatic bladder cancer to the mediastinum and lungs, has refused workup in the past  -Cr 1.4, appears to have worsened from baseline of 1.1 in 2018- continue to monitor, no signs of obstruction noted

## 2020-09-08 NOTE — PHYSICAL THERAPY INITIAL EVALUATION ADULT - PERTINENT HX OF CURRENT PROBLEM, REHAB EVAL
89 y/o Female PMHx bladder mass, recurrent UTIs, anterior mediastinal mass, a-fib (not on AC d/t hematuria), and HTN presents with urinary frequency, dysuria, and AMS, admitted with metabolic encephalopathy 2/2 UTI, likely pyelonephritis.

## 2020-09-08 NOTE — PROGRESS NOTE ADULT - PROBLEM SELECTOR PLAN 7
Cr 1.4 on baseline 1. No BUN elevation. Possibly 2/2 Bactrim use vs JACQUELIN  - Check urine lytes  - monitor Cr/UOP  - Cr downtrending today, FeNA 1.8% suggesting intrinsic etiology Cr 1.4 on baseline 1. No BUN elevation. Possibly 2/2 Bactrim use vs JACQUELIN  - Check urine lytes  - monitor Cr/UOP  - Cr stable, FeNA 1.8% suggesting intrinsic etiology

## 2020-09-08 NOTE — PROGRESS NOTE ADULT - SUBJECTIVE AND OBJECTIVE BOX
PROGRESS NOTE:   Authored by Jaspal Kirkpatrick MD  Pager: [phone]; [facility] [phone]    Patient is a 89y old  Female who presents with a chief complaint of Pyelonephritis (08 Sep 2020 07:50)      SUBJECTIVE / OVERNIGHT EVENTS: No acute events overnight.    ADDITIONAL REVIEW OF SYSTEMS: Reports dysuria (improving). Denies sleep disturbance, confusion, fever, chills, diaphoresis, abdominal pain, nausea, vomiting, diarrhea, constipation, headache, chest pain, and dyspnea.    MEDICATIONS  (STANDING):  cefTRIAXone   IVPB 1000 milliGRAM(s) IV Intermittent every 24 hours  heparin   Injectable 5000 Unit(s) SubCutaneous every 12 hours  influenza  Vaccine (HIGH DOSE) 0.5 milliLiter(s) IntraMuscular once  melatonin 3 milliGRAM(s) Oral at bedtime  metoprolol tartrate 25 milliGRAM(s) Oral two times a day    MEDICATIONS  (PRN):  acetaminophen   Tablet .. 650 milliGRAM(s) Oral every 6 hours PRN Temp greater or equal to 38C (100.4F), Mild Pain (1 - 3)  haloperidol    Injectable 0.5 milliGRAM(s) IntraMuscular every 6 hours PRN Agitation  senna 2 Tablet(s) Oral at bedtime PRN Constipation      CAPILLARY BLOOD GLUCOSE        I&O's Summary      PHYSICAL EXAM:  Vital Signs Last 24 Hrs  T(C): 36.7 (08 Sep 2020 05:56), Max: 36.7 (07 Sep 2020 22:13)  T(F): 98.1 (08 Sep 2020 05:56), Max: 98.1 (07 Sep 2020 22:13)  HR: 84 (08 Sep 2020 05:56) (83 - 85)  BP: 158/68 (08 Sep 2020 05:56) (132/66 - 158/68)  BP(mean): --  RR: 17 (08 Sep 2020 05:56) (17 - 18)  SpO2: 97% (08 Sep 2020 05:56) (96% - 99%)    GENERAL: No acute distress, well-developed  HEAD:  Atraumatic, Normocephalic  EYES: EOMI, PERRLA, conjunctiva and sclera clear  NECK: Supple, no lymphadenopathy, no JVD  CHEST/LUNG: CTAB; No wheezes, rales, or rhonchi  HEART: Regular rate and rhythm; No murmurs, rubs, or gallops  ABDOMEN: Soft, non-tender, non-distended; normal bowel sounds, no organomegaly, no CVA tenderness  EXTREMITIES:  2+ peripheral pulses b/l, No clubbing, cyanosis, or edema  NEUROLOGY: A&O x 3, no focal deficits  SKIN: No rashes or lesions    LABS:                        11.0   5.45  )-----------( 189      ( 08 Sep 2020 07:25 )             34.1     09-08    136  |  99  |  30<H>  ----------------------------<  99  4.2   |  26  |  1.40<H>    Ca    9.0      08 Sep 2020 07:25  Phos  3.5     09-08  Mg     1.8     09-08    TPro  4.9<L>  /  Alb  3.2<L>  /  TBili  0.5  /  DBili  x   /  AST  14  /  ALT  15  /  AlkPhos  66  09-08    PT/INR - ( 06 Sep 2020 11:25 )   PT: 14.0 SEC;   INR: 1.24          PTT - ( 06 Sep 2020 11:25 )  PTT:29.5 SEC          Culture - Urine (collected 06 Sep 2020 01:46)  Source: .Urine Clean Catch (Midstream)  Final Report (07 Sep 2020 11:20):    No growth    Culture - Blood (collected 05 Sep 2020 20:06)  Source: .Blood Blood-Venous  Preliminary Report (06 Sep 2020 21:01):    No growth to date.    Culture - Blood (collected 05 Sep 2020 20:06)  Source: .Blood Blood-Peripheral  Preliminary Report (06 Sep 2020 21:01):    No growth to date.        RADIOLOGY & ADDITIONAL TESTS:    < from: US Kidney and Bladder (09.06.20 @ 19:20) >  IMPRESSION:    Bilateral hydronephrosis, moderate on right, mild on left.    Lobulated bladder mass. Possible bladder calculi.    < end of copied text >      Results Reviewed: Y  Imaging Personally Reviewed: Y  Electrocardiogram Personally Reviewed: Y    COORDINATION OF CARE:  Care Discussed with Consultants/Other Providers [Y/N]: Y  Prior or Outpatient Records Reviewed [Y/N]: Y

## 2020-09-08 NOTE — CONSULT NOTE ADULT - CONVERSATION DETAILS
Palliative team met with patient, Dr. Champagne (palliative fellow), Maren Johansen (Palliative SW) and Dr. Diaz (Palliative attending) at bedside. Patient was conversive, able to tell us why she came to hospital and how her urinary symptoms are improving. She states she lives by herself and was independent prior to admission, ambulating with walker/cane, son lives nearby. She lost her  3 years ago and they were  for 74 years. She has two children, Trenton and Lyly, who are involved. She deferred to Trenton to help make medical decisions and gave permission for us to call him (Trenton [phone]) but states she has never designated a HCP. She states both her children can make decisions for her.     Palliative team spoke to son, Trenton, who expressed understanding of his mother's hospital course and current status. He stated that his mom is aware of her bladder mass and chest mass. It was decided that mom was not a candidate for further workup due to her advanced age of her bladder mass by urology. He states the only option would have been to "scrape the bladder which would reduce the size of the mass" but it wouldn't be curative. Family did not want to pursue that aggressive treatment. We introduced home hospice to him and he seemed interested in this service but also stated that his "mom refused help at home in the past and was very independent at home so he was also interested in rehab services". We explained differences in hospice benefit and restorative PT benefit. Trenton agreed to have mom evaluated for home hospice.   - Advanced directives: Discussed ACPs with son who stated he had never had GOC discussion with his mom in the past but he knows she would not have want CPR or intubation. Encouraged him and his sister, yLly, to discuss GOC with mom, which he agreed to do.

## 2020-09-08 NOTE — PROGRESS NOTE ADULT - PROBLEM SELECTOR PLAN 6
6 month history of bladder mass causing urinary frequency and incontinence. Concern for malignancy given age and smoking history. Per son, not pursuing workup further given risks with diagnostic/therapeutic procedure.   - Primafit for urinary incontinence  - Consider urology consult only if consistent with goals of care  - Clarify with son if patient is taking trospium, noted in [employer] but per son, does not take. 6 month history of bladder mass causing urinary frequency and incontinence. Concern for malignancy given age and smoking history. Per son, not pursuing workup further given risks with diagnostic/therapeutic procedure.   - Primafit for urinary incontinence  - Consider urology consult only if consistent with goals of care

## 2020-09-08 NOTE — CONSULT NOTE ADULT - PROBLEM SELECTOR RECOMMENDATION 3
Known bladder mass.   Patient's family not seeking further treatment due to advanced age  Focus on managing her sx

## 2020-09-09 ENCOUNTER — TRANSCRIPTION ENCOUNTER (OUTPATIENT)
Age: 85
End: 2020-09-09

## 2020-09-09 VITALS
DIASTOLIC BLOOD PRESSURE: 94 MMHG | TEMPERATURE: 98 F | HEART RATE: 83 BPM | SYSTOLIC BLOOD PRESSURE: 158 MMHG | OXYGEN SATURATION: 97 % | RESPIRATION RATE: 18 BRPM

## 2020-09-09 LAB
ANION GAP SERPL CALC-SCNC: 12 MMO/L — SIGNIFICANT CHANGE UP (ref 7–14)
BUN SERPL-MCNC: 29 MG/DL — HIGH (ref 7–23)
CALCIUM SERPL-MCNC: 9.4 MG/DL — SIGNIFICANT CHANGE UP (ref 8.4–10.5)
CHLORIDE SERPL-SCNC: 102 MMOL/L — SIGNIFICANT CHANGE UP (ref 98–107)
CO2 SERPL-SCNC: 23 MMOL/L — SIGNIFICANT CHANGE UP (ref 22–31)
CREAT SERPL-MCNC: 1.31 MG/DL — HIGH (ref 0.5–1.3)
GLUCOSE SERPL-MCNC: 99 MG/DL — SIGNIFICANT CHANGE UP (ref 70–99)
HCT VFR BLD CALC: 37.8 % — SIGNIFICANT CHANGE UP (ref 34.5–45)
HGB BLD-MCNC: 11.9 G/DL — SIGNIFICANT CHANGE UP (ref 11.5–15.5)
MAGNESIUM SERPL-MCNC: 1.8 MG/DL — SIGNIFICANT CHANGE UP (ref 1.6–2.6)
MCHC RBC-ENTMCNC: 29 PG — SIGNIFICANT CHANGE UP (ref 27–34)
MCHC RBC-ENTMCNC: 31.5 % — LOW (ref 32–36)
MCV RBC AUTO: 92.2 FL — SIGNIFICANT CHANGE UP (ref 80–100)
NRBC # FLD: 0 K/UL — SIGNIFICANT CHANGE UP (ref 0–0)
PHOSPHATE SERPL-MCNC: 3.3 MG/DL — SIGNIFICANT CHANGE UP (ref 2.5–4.5)
PLATELET # BLD AUTO: 204 K/UL — SIGNIFICANT CHANGE UP (ref 150–400)
PMV BLD: 10.7 FL — SIGNIFICANT CHANGE UP (ref 7–13)
POTASSIUM SERPL-MCNC: 4.1 MMOL/L — SIGNIFICANT CHANGE UP (ref 3.5–5.3)
POTASSIUM SERPL-SCNC: 4.1 MMOL/L — SIGNIFICANT CHANGE UP (ref 3.5–5.3)
RBC # BLD: 4.1 M/UL — SIGNIFICANT CHANGE UP (ref 3.8–5.2)
RBC # FLD: 13.8 % — SIGNIFICANT CHANGE UP (ref 10.3–14.5)
SODIUM SERPL-SCNC: 137 MMOL/L — SIGNIFICANT CHANGE UP (ref 135–145)
WBC # BLD: 5.23 K/UL — SIGNIFICANT CHANGE UP (ref 3.8–10.5)
WBC # FLD AUTO: 5.23 K/UL — SIGNIFICANT CHANGE UP (ref 3.8–10.5)

## 2020-09-09 PROCEDURE — 99233 SBSQ HOSP IP/OBS HIGH 50: CPT | Mod: GC

## 2020-09-09 PROCEDURE — 99239 HOSP IP/OBS DSCHRG MGMT >30: CPT | Mod: GC

## 2020-09-09 RX ORDER — CEFUROXIME AXETIL 250 MG
1 TABLET ORAL
Qty: 6 | Refills: 0
Start: 2020-09-09 | End: 2020-09-11

## 2020-09-09 RX ORDER — CEFUROXIME AXETIL 250 MG
1 TABLET ORAL
Qty: 4 | Refills: 0
Start: 2020-09-09 | End: 2020-09-10

## 2020-09-09 RX ORDER — QUETIAPINE FUMARATE 200 MG/1
0.5 TABLET, FILM COATED ORAL
Qty: 15 | Refills: 0
Start: 2020-09-09 | End: 2020-10-08

## 2020-09-09 RX ADMIN — Medication 25 MILLIGRAM(S): at 06:42

## 2020-09-09 RX ADMIN — CEFTRIAXONE 100 MILLIGRAM(S): 500 INJECTION, POWDER, FOR SOLUTION INTRAMUSCULAR; INTRAVENOUS at 16:01

## 2020-09-09 RX ADMIN — HEPARIN SODIUM 5000 UNIT(S): 5000 INJECTION INTRAVENOUS; SUBCUTANEOUS at 06:42

## 2020-09-09 NOTE — DISCHARGE NOTE PROVIDER - NSDCMRMEDTOKEN_GEN_ALL_CORE_FT
Lipitor 10 mg oral tablet: 1 tab(s) orally once a day  metoprolol tartrate 25 mg oral tablet: 1 tab(s) orally 2 times a day cefuroxime 250 mg oral tablet: 1 tab(s) orally every 12 hours   Lipitor 10 mg oral tablet: 1 tab(s) orally once a day  metoprolol tartrate 25 mg oral tablet: 1 tab(s) orally 2 times a day  SEROquel 25 mg oral tablet: 0.5 tab(s) orally once a day (at bedtime)

## 2020-09-09 NOTE — PROGRESS NOTE ADULT - PROBLEM SELECTOR PLAN 8
Interval enlargement on June Chest CT. Possibly contributing to tachypnea. Per discussion with son, no further workup was being pursued.  - Follow-up goals of care with son and patient   - palliative on board  - Likely outpatient f/u

## 2020-09-09 NOTE — PROGRESS NOTE ADULT - SUBJECTIVE AND OBJECTIVE BOX
HPI:  89F PMHx bladder mass, recurrent UTIs, anterior mediastinal mass, a-fib (not on AC d/t hematuria), and HTN presents with urinary frequency, dysuria, and AMS. Per discussion with patient's son, patient was confused, disoriented, and "out of it." At baseline, she is fully alert and oriented. For the past 6 months she has had urinary frequency to the extent of being unable to sleep, per discussion with the son, she has not undergone cystoscopy for this due to concerns about her age and she refuses a urinary catheter. For the past week she has had dysuria and urinary incontinence, and started treatment with Bactrim yesterday. Also reports recently noting pink urine. Per chart review of outpatient records, urine culture from July grew Klebsiella and Proteus. Denies subjective fevers, chills, cough, SOB, chest pain, nausea, vomiting, abdominal pain, diarrhea, constipation, fecal incontinence.    Notably, has recent chest CT showing interval enlargement of left pleural mass with mediastinal infiltration.  On arrival to ED, T 100.8, , /97, RR 27, SpO2 100%. UA was positive. Given 1g CTX, 500cc bolus IV NS, acetaminophen. (05 Sep 2020 22:24)  ================================================================================  INTERVAL EVENTS: Palliative consulted for GOC.     - 9/8: Palliative team had family discussion with son. Hospice referral made.   - 9/9: No overnight events. She was seen this morning in good spirits. She denied pain and urinary sx. She asked when she would be able to go home as "she is bored being in the hospital".     DNR on chart:   Allergies    No Known Allergies    Intolerances    Advil (Stomach Upset)  MEDICATIONS  (STANDING):  cefTRIAXone   IVPB 1000 milliGRAM(s) IV Intermittent every 24 hours  heparin   Injectable 5000 Unit(s) SubCutaneous every 12 hours  influenza  Vaccine (HIGH DOSE) 0.5 milliLiter(s) IntraMuscular once  melatonin 3 milliGRAM(s) Oral at bedtime  metoprolol tartrate 25 milliGRAM(s) Oral two times a day  QUEtiapine 12.5 milliGRAM(s) Oral at bedtime    MEDICATIONS  (PRN):  acetaminophen   Tablet .. 650 milliGRAM(s) Oral every 6 hours PRN Temp greater or equal to 38C (100.4F), Mild Pain (1 - 3)  haloperidol    Injectable 0.5 milliGRAM(s) IntraMuscular every 6 hours PRN Agitation  senna 2 Tablet(s) Oral at bedtime PRN Constipation      ITEMS UNCHECKED ARE NOT PRESENT    PRESENT SYMPTOMS: [ ]Unable to obtain due to poor mentation   Source if other than patient:  [ ]Family   [ ]Team     Pain (Impact on QOL):  denies  Location:  Minimal acceptable level (0-10 scale):                   Aggravating factors:  Quality:  Radiation:  Severity (0-10 scale):    Timing:    Dyspnea:                           [ ]Mild [ ]Moderate [ ]Severe  Anxiety:                             [ ]Mild [ ]Moderate [ ]Severe  Fatigue:                             [ ]Mild [ ]Moderate [ ]Severe  Nausea:                             [ ]Mild [ ]Moderate [ ]Severe  Loss of appetite:              [ ]Mild [ ]Moderate [ ]Severe  Constipation:                    [ ]Mild [ ]Moderate [ ]Severe    PAIN AD Score:	  [email or web address]/painad.pdf (Ctrl + left click to view)    Other Symptoms:  [ ]All other review of systems negative     Karnofsky Performance Score/Palliative Performance Status Version 2:   70-80 %    http://palliative.info/resource_material/[email or web address].pdf    PHYSICAL EXAM:  Vital Signs Last 24 Hrs  T(C): 36.4 (09 Sep 2020 06:28), Max: 36.8 (08 Sep 2020 14:34)  T(F): 97.5 (09 Sep 2020 06:28), Max: 98.2 (08 Sep 2020 14:34)  HR: 74 (09 Sep 2020 06:28) (74 - 89)  BP: 155/78 (09 Sep 2020 06:28) (149/80 - 158/76)  BP(mean): --  RR: 18 (09 Sep 2020 06:28) (17 - 18)  SpO2: 96% (09 Sep 2020 06:28) (96% - 100%) I&O's Summary    08 Sep 2020 07:01  -  09 Sep 2020 07:00  --------------------------------------------------------  IN: 200 mL / OUT: 0 mL / NET: 200 mL     GENERAL:  [x ]Alert  [ x]Answers questions appropriately   [ ]Lethargic  [ ]Cachexia  [ ]Unarousable  [x ]Verbal  [ ]Non-Verbal  Behavioral:   [ ] Anxiety  [ ] Delirium [ ] Agitation [ ] Other  HEENT: dentures were loose in her mouth   [ x]Normal   [ ]Dry mouth   [ ]ET Tube/Trach  [ ]Oral lesions  PULMONARY:   [x ]Clear [ ]Tachypnea  [ ]Audible excessive secretions   [ ]Rhonchi        [ ]Right [ ]Left [ ]Bilateral  [ ]Crackles        [ ]Right [ ]Left [ ]Bilateral  [ ]Wheezing     [ ]Right [ ]Left [ ]Bilateral  CARDIOVASCULAR:    [x ]Regular [ ]Irregular [ ]Tachy  [ ]Magdiel [ ]Murmur [ ]Other  GASTROINTESTINAL:  [x ]Soft  [ ]Distended   [ ]+BS  [ ]Non tender [ ]Tender  [ ]PEG [ ]OGT/ NGT   Last BM:    GENITOURINARY:  [x ]Normal [ ] Incontinent   [ ]Oliguria/Anuria   [ ]Cole  MUSCULOSKELETAL:   [x ]Normal   [ ]Weakness  [ ]Bed/Wheelchair bound [ ]Edema  NEUROLOGIC:   [x ]No focal deficits  [ ] Cognitive impairment  [ ] Dysphagia [ ]Dysarthria [ ] Paresis [ ]Other   SKIN:   [x ]Normal   [ ]Pressure ulcer(s)  [ ]Rash    CRITICAL CARE:  [ ] Shock Present  [ ]Septic [ ]Cardiogenic [ ]Neurologic [ ]Hypovolemic  [ ]  Vasopressors [ ]  Inotropes   [ ] Respiratory failure present  [ ] Acute  [ ] Chronic [ ] Hypoxic  [ ] Hypercarbic [ ] Other  [ ] Other organ failure     GRIEF   [ ] Yes  [x ] No    LABS:                        11.9   5.23  )-----------( 204      ( 09 Sep 2020 06:55 )             37.8   09-09    137  |  102  |  29<H>  ----------------------------<  99  4.1   |  23  |  1.31<H>    Ca    9.4      09 Sep 2020 06:55  Phos  3.3     09-09  Mg     1.8     09-09    TPro  4.9<L>  /  Alb  3.2<L>  /  TBili  0.5  /  DBili  x   /  AST  14  /  ALT  15  /  AlkPhos  66  09-08        RADIOLOGY & ADDITIONAL STUDIES:   n/a    Protein Calorie Malnutrition Present: [ ] yes [ ] no  [ ] PPSV2 < or = 30%  [ ] significant weight loss [ ] poor nutritional intake [ ] anasarca [ ] catabolic state Albumin, Serum: 3.2 g/dL (09-08-20 @ 07:25)  Artificial Nutrition [ ]     REFERRALS:   [ ]Chaplaincy  [x ] Hospice  [ ]Child Life  [ ]Social Work  [ ]Case management [ ]Holistic Therapy   Goals of Care Document: CHRISTY Mason (09-08-20 @ 16:56)  Goals of Care Conversation:   Advance Directives:  · Caregiver:  yes  · Name  Trenton Meier  · Phone #  [phone]    Conversation Discussion:  · Conversation  Hospice Referral  · Conversation Details  HOSPICE CARE NETWORK    Referral for evaluation for hospice care received.   Documentation reviewed.     HCN MD reviewing information. Await decision from Hospice MD re pt's eligibility for home hospice care under Medicare guidelines.     HCN RN continues to follow and will follow up with Palliative Care Team and  on Wed 9/9/2020      Electronic Signatures:  Aleisha Mason (YANI)  (Signed 08-Sep-2020 16:59)  	Authored: Goals of Care Conversation      Last Updated: 08-Sep-2020 16:59 by Aleisha Mason (YANI) HPI:  89F PMHx bladder mass, recurrent UTIs, anterior mediastinal mass, a-fib (not on AC d/t hematuria), and HTN presents with urinary frequency, dysuria, and AMS. Per discussion with patient's son, patient was confused, disoriented, and "out of it." At baseline, she is fully alert and oriented. For the past 6 months she has had urinary frequency to the extent of being unable to sleep, per discussion with the son, she has not undergone cystoscopy for this due to concerns about her age and she refuses a urinary catheter. For the past week she has had dysuria and urinary incontinence, and started treatment with Bactrim yesterday. Also reports recently noting pink urine. Per chart review of outpatient records, urine culture from July grew Klebsiella and Proteus. Denies subjective fevers, chills, cough, SOB, chest pain, nausea, vomiting, abdominal pain, diarrhea, constipation, fecal incontinence.    Notably, has recent chest CT showing interval enlargement of left pleural mass with mediastinal infiltration.  On arrival to ED, T 100.8, , /97, RR 27, SpO2 100%. UA was positive. Given 1g CTX, 500cc bolus IV NS, acetaminophen. (05 Sep 2020 22:24)  ================================================================================  INTERVAL EVENTS: Palliative consulted for GOC.     - 9/8: Palliative team had family discussion with son. Hospice referral made.   - 9/9: No overnight events. She was seen this morning in good spirits. She denied pain and urinary sx. She asked when she would be able to go home as "she is bored being in the hospital".     DNR on chart:   Allergies    No Known Allergies    Intolerances    Advil (Stomach Upset)  MEDICATIONS  (STANDING):  cefTRIAXone   IVPB 1000 milliGRAM(s) IV Intermittent every 24 hours  heparin   Injectable 5000 Unit(s) SubCutaneous every 12 hours  influenza  Vaccine (HIGH DOSE) 0.5 milliLiter(s) IntraMuscular once  melatonin 3 milliGRAM(s) Oral at bedtime  metoprolol tartrate 25 milliGRAM(s) Oral two times a day  QUEtiapine 12.5 milliGRAM(s) Oral at bedtime    MEDICATIONS  (PRN):  acetaminophen   Tablet .. 650 milliGRAM(s) Oral every 6 hours PRN Temp greater or equal to 38C (100.4F), Mild Pain (1 - 3)  haloperidol    Injectable 0.5 milliGRAM(s) IntraMuscular every 6 hours PRN Agitation  senna 2 Tablet(s) Oral at bedtime PRN Constipation    ITEMS UNCHECKED ARE NOT PRESENT    PRESENT SYMPTOMS: [ ]Unable to obtain due to poor mentation   Source if other than patient:  [ ]Family   [ ]Team     Pain (Impact on QOL):  denies  Location:  Minimal acceptable level (0-10 scale):                   Aggravating factors:  Quality:  Radiation:  Severity (0-10 scale):    Timing:    Dyspnea:                           [ ]Mild [ ]Moderate [ ]Severe  Anxiety:                             [ ]Mild [ ]Moderate [ ]Severe  Fatigue:                             [ ]Mild [ ]Moderate [ ]Severe  Nausea:                             [ ]Mild [ ]Moderate [ ]Severe  Loss of appetite:              [ ]Mild [ ]Moderate [ ]Severe  Constipation:                    [ ]Mild [ ]Moderate [ ]Severe    PAIN AD Score:	  [email or web address]/painad.[email or web address] (Ctrl + left click to view)    Other Symptoms:  [x ]All other review of systems negative     Karnofsky Performance Score/Palliative Performance Status Version 2:   70-80 %    http://palliative.info/resource_material/PPSv2.pdf    PHYSICAL EXAM:  Vital Signs Last 24 Hrs  T(C): 36.4 (09 Sep 2020 06:28), Max: 36.8 (08 Sep 2020 14:34)  T(F): 97.5 (09 Sep 2020 06:28), Max: 98.2 (08 Sep 2020 14:34)  HR: 74 (09 Sep 2020 06:28) (74 - 89)  BP: 155/78 (09 Sep 2020 06:28) (149/80 - 158/76)  BP(mean): --  RR: 18 (09 Sep 2020 06:28) (17 - 18)  SpO2: 96% (09 Sep 2020 06:28) (96% - 100%) I&O's Summary    08 Sep 2020 07:01  -  09 Sep 2020 07:00  --------------------------------------------------------  IN: 200 mL / OUT: 0 mL / NET: 200 mL    GENERAL:  [x ]Alert  [ x]Answers questions appropriately   [ ]Lethargic  [ ]Cachexia  [ ]Unarousable  [x ]Verbal  [ ]Non-Verbal  Behavioral:   [ ] Anxiety  [ ] Delirium [ ] Agitation [ ] Other  HEENT: dentures were loose in her mouth   [ x]Normal   [ ]Dry mouth   [ ]ET Tube/Trach  [ ]Oral lesions  PULMONARY:   [x ]Clear [ ]Tachypnea  [ ]Audible excessive secretions   [ ]Rhonchi        [ ]Right [ ]Left [ ]Bilateral  [ ]Crackles        [ ]Right [ ]Left [ ]Bilateral  [ ]Wheezing     [ ]Right [ ]Left [ ]Bilateral  CARDIOVASCULAR:    [x ]Regular [ ]Irregular [ ]Tachy  [ ]Magdiel [ ]Murmur [ ]Other  GASTROINTESTINAL:  [x ]Soft  [ ]Distended   [ ]+BS  [ ]Non tender [ ]Tender  [ ]PEG [ ]OGT/ NGT   Last BM:    GENITOURINARY:  [x ]Normal [ ] Incontinent   [ ]Oliguria/Anuria   [ ]Cole  MUSCULOSKELETAL:   [x ]Normal   [ ]Weakness  [ ]Bed/Wheelchair bound [ ]Edema  NEUROLOGIC:   [x ]No focal deficits  [ ] Cognitive impairment  [ ] Dysphagia [ ]Dysarthria [ ] Paresis [ ]Other   SKIN:   [x ]Normal   [ ]Pressure ulcer(s)  [ ]Rash    CRITICAL CARE:  [ ] Shock Present  [ ]Septic [ ]Cardiogenic [ ]Neurologic [ ]Hypovolemic  [ ]  Vasopressors [ ]  Inotropes   [ ] Respiratory failure present  [ ] Acute  [ ] Chronic [ ] Hypoxic  [ ] Hypercarbic [ ] Other  [ ] Other organ failure     GRIEF   [ ] Yes  [x ] No    LABS:                        11.9   5.23  )-----------( 204      ( 09 Sep 2020 06:55 )             37.8   09-09    137  |  102  |  29<H>  ----------------------------<  99  4.1   |  23  |  1.31<H>    Ca    9.4      09 Sep 2020 06:55  Phos  3.3     09-09  Mg     1.8     09-09    TPro  4.9<L>  /  Alb  3.2<L>  /  TBili  0.5  /  DBili  x   /  AST  14  /  ALT  15  /  AlkPhos  66  09-08    RADIOLOGY & ADDITIONAL STUDIES: reviewed    Protein Calorie Malnutrition Present: [ ] yes [ ] no  [ ] PPSV2 < or = 30%  [ ] significant weight loss [ ] poor nutritional intake [ ] anasarca [ ] catabolic state Albumin, Serum: 3.2 g/dL (09-08-20 @ 07:25)  Artificial Nutrition [ ]     REFERRALS:   [ ]Chaplaincy  [x ] Hospice  [ ]Child Life  [ ]Social Work  [ ]Case management [ ]Holistic Therapy   Goals of Care Document: CHRISTY Mason (09-08-20 @ 16:56)  Goals of Care Conversation:   Advance Directives:  · Caregiver:  yes  · Name  Trenton Meier  · Phone #  [phone]    Conversation Discussion:  · Conversation  Hospice Referral  · Conversation Details  HOSPICE CARE NETWORK    Referral for evaluation for hospice care received.   Documentation reviewed.     HCN MD reviewing information. Await decision from Hospice MD re pt's eligibility for home hospice care under Medicare guidelines.     HCN RN continues to follow and will follow up with Palliative Care Team and  on Wed 9/9/2020      Electronic Signatures:  Aleisha Mason (YANI)  (Signed 08-Sep-2020 16:59)  	Authored: Goals of Care Conversation      Last Updated: 08-Sep-2020 16:59 by Aleisha Mason (YANI)

## 2020-09-09 NOTE — PROGRESS NOTE ADULT - PROBLEM SELECTOR PLAN 5
Full Code  Home Hospice accepted patient Full Code at this time. She is independent and functional of ADLs at baseline.   Home Hospice accepted patient. Waiting for signed consents by family.

## 2020-09-09 NOTE — PROGRESS NOTE ADULT - PROBLEM SELECTOR PLAN 1
Agitation 2/2 likely sundowning vs. possible AMS from UTI   - Continue Seroquel 12.5mg PO qhs. Recommend prescribing her seroquel 12.5mg at bedtime if being discharged.   - Continue haldol 0.5mg IM prn agitation   - Constant observation per primary team as patient is a fall risk   - Continue melatonin 3mg PO qhs. Agitation 2/2 likely sundowning vs. possible AMS from UTI   - Continue Seroquel 12.5mg PO qhs. Recommend prescribing her Seroquel 12.5mg at bedtime if being discharged.   - Continue haldol 0.5mg IM prn agitation   - Constant observation per primary team as patient is a fall risk   - Continue melatonin 3mg PO qhs.

## 2020-09-09 NOTE — PROGRESS NOTE ADULT - PROBLEM SELECTOR PLAN 10
Pall care consult. Discuss multiple malignancies with family/son and determine if further w/u is preferred as likely poor surgical/chemo candidate vs hospice vs LTC placement.

## 2020-09-09 NOTE — PROGRESS NOTE ADULT - PROBLEM SELECTOR PLAN 3
Confused and disoriented at home and on initial presentation, rapidly improved with IVF and abx. No focal deficits. Likely metabolic encephalopathy from sepsis from UTI  - Poor sleep overnight and agitated yesterday, AOx2, most likely delirium  - Today, reports improved sleep and AOx3, at baseline  - Melatonin 3mg QHS, Haldol 0.5mg IM for agitation Confused and disoriented at home and on initial presentation, rapidly improved with IVF and abx. No focal deficits. Likely metabolic encephalopathy from sepsis from UTI  - Poor sleep overnight and agitated yesterday, AOx2, most likely delirium  - Today, continues to reports improved sleep and AOx3, at baseline x2days  - Melatonin 3mg QHS, Seroquel 12.5mg QHS, Haldol 0.5mg IM PRN agitation

## 2020-09-09 NOTE — PROGRESS NOTE ADULT - PROBLEM SELECTOR PLAN 6
6 month history of bladder mass causing urinary frequency and incontinence. Concern for malignancy given age and smoking history. Per son, not pursuing workup further given risks with diagnostic/therapeutic procedure.   - Primafit for urinary incontinence  - Consider urology consult only if consistent with goals of care

## 2020-09-09 NOTE — PROGRESS NOTE ADULT - PROBLEM SELECTOR PLAN 1
+UA, fever, +right CVA tenderness, AMS. History of Klebsiella and Proteus on urine culture, both sensitive to CTX. History of recurrent UTIs in setting of bladder.  - S/p 1 day of bactrim at home   - Day 4 ceftriaxone 1g IV daily, with improvement in fever/dysuria  - Renal US complete, with b/l hydronephrosis with known bladder mass  - Patient afebrile, continue to monitor vitals and CBC (WBC WNL) +UA, fever, +right CVA tenderness, AMS. History of Klebsiella and Proteus on urine culture, both sensitive to CTX. History of recurrent UTIs in setting of bladder.  - S/p 1 day of bactrim at home   - Day 5 ceftriaxone 1g IV daily, with improvement in fever/dysuria  - Renal US complete, with b/l hydronephrosis with known bladder mass  - Patient afebrile, continue to monitor vitals and CBC (WBC WNL)

## 2020-09-09 NOTE — PROGRESS NOTE ADULT - PROBLEM SELECTOR PLAN 2
On Rocephin 1g q24hrs x10 days (9/6) for UTI   - Urine cx NGTD (9/6)   - Patient has bladder mass which could be primary etiology of recurrent UTIs. Son wishes to avoid constant rehospitalization for UTIs. Home Hospice introduced and son was agreeable to evaluation prior to dc. On Rocephin 1g q24hrs x10 days (9/6) for UTI. Transition to PO abx when appropriate   - Urine cx NGTD (9/6)   - Patient has bladder mass which could be primary etiology of recurrent UTIs. Son wishes to avoid constant rehospitalization for UTIs. Home Hospice introduced and son was agreeable to evaluation prior to dc.

## 2020-09-09 NOTE — DISCHARGE NOTE PROVIDER - HOSPITAL COURSE
HPI:    89F PMHx bladder mass, recurrent UTIs, anterior mediastinal mass, a-fib (not on AC d/t hematuria), and HTN presents with urinary frequency, dysuria, and AMS. Per discussion with patient's son, patient was confused, disoriented, and "out of it." At baseline, she is fully alert and oriented. For the past 6 months she has had urinary frequency to the extent of being unable to sleep, per discussion with the son, she has not undergone cystoscopy for this due to concerns about her age and she refuses a urinary catheter. For the past week she has had dysuria and urinary incontinence, and started treatment with Bactrim yesterday. Also reports recently noting pink urine. Per chart review of outpatient records, urine culture from July grew Klebsiella and Proteus. Denies subjective fevers, chills, cough, SOB, chest pain, nausea, vomiting, abdominal pain, diarrhea, constipation, fecal incontinence.        Notably, has recent chest CT showing interval enlargement of left pleural mass with mediastinal infiltration.    On arrival to ED, T 100.8, , /97, RR 27, SpO2 100%. UA was positive. Given 1g CTX, 500cc bolus IV NS, acetaminophen. (05 Sep 2020 22:24)        UCx was obtained on patient, however was obtained after abx given, and thus did not grow out. In setting of flank pain and positive UA, patient was treated empirically for pyelonephritis with ceftriaxone while in house. Patient had an evening of mild delirium, resolved with PM seroquel and reorientation. PT recommended patient for home PT services for general deconditioning. GOC were discussed with the family via palliative care re: a large mediastinal mass found on CT, likely mets to her primary bladder cancer. Family and patient decided on no interventions; hospice was discussed, however, family declined services and opted for a private home health aide.         Patient was discharged home in stable condition with instructions to finish a course of oral antibiotics and follow up with her PCP in 1 week. HPI:    89F PMHx bladder mass, recurrent UTIs, anterior mediastinal mass, a-fib (not on AC d/t hematuria), and HTN presents with urinary frequency, dysuria, and AMS. Per discussion with patient's son, patient was confused, disoriented, and "out of it." At baseline, she is fully alert and oriented. For the past 6 months she has had urinary frequency to the extent of being unable to sleep, per discussion with the son, she has not undergone cystoscopy for this due to concerns about her age and she refuses a urinary catheter. For the past week she has had dysuria and urinary incontinence, and started treatment with Bactrim yesterday. Also reports recently noting pink urine. Per chart review of outpatient records, urine culture from July grew Klebsiella and Proteus. Denies subjective fevers, chills, cough, SOB, chest pain, nausea, vomiting, abdominal pain, diarrhea, constipation, fecal incontinence.        Notably, has recent chest CT showing interval enlargement of left pleural mass with mediastinal infiltration.    On arrival to ED, T 100.8, , /97, RR 27, SpO2 100%. UA was positive. Given 1g CTX, 500cc bolus IV NS, acetaminophen. (05 Sep 2020 22:24)        UCx was obtained on patient, however was obtained after abx given, and thus did not grow out. In setting of flank pain and positive UA, patient was treated empirically for pyelonephritis with ceftriaxone while in house. Patient had an evening of mild delirium, resolved with PM seroquel and reorientation. PT recommended patient for home PT services for general deconditioning. GOC were discussed with the family via palliative care re: a large mediastinal mass found on CT, likely mets to her primary bladder cancer. Family and patient decided on no interventions; hospice was discussed, and patient will receive home hospice services.         Patient was discharged home in stable condition with instructions to finish a course of oral antibiotics and follow up with her PCP in 1 week.

## 2020-09-09 NOTE — DISCHARGE NOTE PROVIDER - NSDCCPCAREPLAN_GEN_ALL_CORE_FT
PRINCIPAL DISCHARGE DIAGNOSIS  Diagnosis: Urinary tract infection  Assessment and Plan of Treatment: You were treated with IV antibiotics for an infection in the bladder and kidney. You will finish a course of an oral equivalent at home; please take medications as directed and finish all pills.      SECONDARY DISCHARGE DIAGNOSES  Diagnosis: Mediastinal mass  Assessment and Plan of Treatment: A large mass was found in your chest on imaging; though we are currently uncertain exactly what it is, it is likely to be a metastatic lesion from your bladder cancer. Should you chose to treat or evaluate this in the future, your PCP should be able to get you an appropriate referral to a specialist.

## 2020-09-09 NOTE — PROGRESS NOTE ADULT - ASSESSMENT
89F PMHx bladder mass, recurrent UTIs, anterior mediastinal mass, a-fib (not on AC d/t hematuria), and HTN presents with urinary frequency, dysuria, and AMS, admitted with metabolic encephalopathy 2/2 UTI, likely pyelonephritis. On day 4 ceftriaxone, delirium is resolved today and patient is clinically improved. As her delirium has been fluctuating, will continue to monitor and keep IV abx as she refuses PO meds when delirious. 89F PMHx bladder mass, recurrent UTIs, anterior mediastinal mass, a-fib (not on AC d/t hematuria), and HTN presents with urinary frequency, dysuria, and AMS, admitted with metabolic encephalopathy 2/2 UTI, likely pyelonephritis. On day 5 ceftriaxone, delirium continues to be resolved and patient remains clinically improved. Dispo pending per palliative recs.

## 2020-09-09 NOTE — PROGRESS NOTE ADULT - PROBLEM SELECTOR PLAN 3
Known bladder mass.   Patient's family not seeking further treatment due to advanced age  Focus on managing her sx.

## 2020-09-09 NOTE — PROGRESS NOTE ADULT - PROBLEM SELECTOR PLAN 7
Cr 1.4 on baseline 1. No BUN elevation. Possibly 2/2 Bactrim use vs JACQUELIN  - Check urine lytes  - monitor Cr/UOP  - Cr stable, FeNA 1.8% suggesting intrinsic etiology

## 2020-09-09 NOTE — GOALS OF CARE CONVERSATION - ADVANCED CARE PLANNING - CONVERSATION DETAILS
TC from [facility] reporting that she spoke to patient and son Trenton and they are receptive to Hospice services. HCN to F/U re consents and discussion of Hospice services.

## 2020-09-09 NOTE — PROGRESS NOTE ADULT - PROBLEM SELECTOR PLAN 2
, T 100.8, RR 27 in ED with altered mental status. Improved w acetaminophen and ceftriaxone. Lactate WNL.   - S/p 500 cc bolus NS  - CXR notable only for mediastinal mass  - sepsis improving, c/w antibiotics as above, cultures without growth x4days , T 100.8, RR 27 in ED with altered mental status. Improved w acetaminophen and ceftriaxone. Lactate WNL.   - S/p 500 cc bolus NS  - CXR notable only for mediastinal mass  - Sepsis improved, c/w antibiotics as above, cultures without growth x5days

## 2020-09-09 NOTE — DISCHARGE NOTE PROVIDER - CARE PROVIDER_API CALL
[facility]  [address] [place] [address]  Phone: [phone]  Fax: [phone]  Follow Up Time: 1 week

## 2020-09-09 NOTE — PROGRESS NOTE ADULT - PROBLEM SELECTOR PLAN 9
DVT: SQH, monitor for hematuria  Diet: Regular, DASH  Dispo: Pending  GOC: Discussed with [place]. Currently FULL CODE, but considering goals of care. Possible palliative care c/s

## 2020-09-09 NOTE — PROGRESS NOTE ADULT - PROBLEM SELECTOR PLAN 4
Not on AC d/t history of hematuria and epistaxis on Eliquis  - Metoprolol 25mg BID - home dose  - NSR on EKG in ED
Not on AC d/t history of hematuria and epistaxis on Eliquis  - Metoprolol 25mg BID - home dose  - NSR on EKG in ED
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 Not seeking further workup.
Not on AC d/t history of hematuria and epistaxis on Eliquis  - Metoprolol 25mg BID - home dose  - NSR on EKG in ED
Not on AC d/t history of hematuria and epistaxis on Eliquis  - Metoprolol 25mg BID - home dose  - NSR on EKG in ED

## 2020-09-09 NOTE — PROGRESS NOTE ADULT - ATTENDING COMMENTS
89F PMHx bladder mass, recurrent UTIs, anterior mediastinal mass, Afib (not on AC 2/2 hematuria), and HTN admitted with metabolic encephalopathy 2/2 UTI, likely pyelonephritis.   -C/w CTX empirically- can switch to Ceftin upon d/c to complete 7 days of total abx  -F/u blood cultures, NGTD  -Palliative following, hospice referral sent  -Cr 1.4, appears to have worsened from baseline of 1.1 in 2018- continue to monitor, no signs of obstruction noted  -D/c planning to home hospice 89F PMHx bladder mass, recurrent UTIs, anterior mediastinal mass, Afib (not on AC 2/2 hematuria), and HTN admitted with metabolic encephalopathy 2/2 UTI, likely pyelonephritis.   -C/w CTX empirically- can switch to Ceftin upon d/c to complete 7 days of total abx  -F/u blood cultures, NGTD  -Palliative following, hospice referral sent  -Cr 1.4, appears to have worsened from baseline of 1.1 in 2018- continue to monitor, no signs of obstruction noted    D/c to home hospice  35 minutes spent on d/c planning

## 2020-09-09 NOTE — PROGRESS NOTE ADULT - SUBJECTIVE AND OBJECTIVE BOX
PROGRESS NOTE:   Authored by Jaspal Kirkpatrick MD  Pager: [phone]; [phone]    Patient is a 89y old  Female who presents with a chief complaint of Pyelonephritis (09 Sep 2020 12:38)      SUBJECTIVE / OVERNIGHT EVENTS: No acute events overnight.    ADDITIONAL REVIEW OF SYSTEMS: Reports good sleep. Denies dysuria, flank pain, fever, chills, diaphoresis, confusion, headache, abdominal pain, myalgia, dyspnea, and chest pain.    MEDICATIONS  (STANDING):  cefTRIAXone   IVPB 1000 milliGRAM(s) IV Intermittent every 24 hours  heparin   Injectable 5000 Unit(s) SubCutaneous every 12 hours  influenza  Vaccine (HIGH DOSE) 0.5 milliLiter(s) IntraMuscular once  melatonin 3 milliGRAM(s) Oral at bedtime  metoprolol tartrate 25 milliGRAM(s) Oral two times a day  QUEtiapine 12.5 milliGRAM(s) Oral at bedtime    MEDICATIONS  (PRN):  acetaminophen   Tablet .. 650 milliGRAM(s) Oral every 6 hours PRN Temp greater or equal to 38C (100.4F), Mild Pain (1 - 3)  haloperidol    Injectable 0.5 milliGRAM(s) IntraMuscular every 6 hours PRN Agitation  senna 2 Tablet(s) Oral at bedtime PRN Constipation      CAPILLARY BLOOD GLUCOSE        I&O's Summary    08 Sep 2020 07:01  -  09 Sep 2020 07:00  --------------------------------------------------------  IN: 200 mL / OUT: 0 mL / NET: 200 mL        PHYSICAL EXAM:  Vital Signs Last 24 Hrs  T(C): 36.4 (09 Sep 2020 06:28), Max: 36.8 (08 Sep 2020 14:34)  T(F): 97.5 (09 Sep 2020 06:28), Max: 98.2 (08 Sep 2020 14:34)  HR: 74 (09 Sep 2020 06:28) (74 - 89)  BP: 155/78 (09 Sep 2020 06:28) (149/80 - 158/76)  BP(mean): --  RR: 18 (09 Sep 2020 06:28) (17 - 18)  SpO2: 96% (09 Sep 2020 06:28) (96% - 100%)    GENERAL: No acute distress, well-developed  HEAD:  Atraumatic, Normocephalic  EYES: EOMI, PERRLA, conjunctiva and sclera clear  NECK: Supple, no lymphadenopathy, no JVD  CHEST/LUNG: CTAB; No wheezes, rales, or rhonchi  HEART: Regular rate and rhythm; No murmurs, rubs, or gallops  ABDOMEN: Soft, non-tender, non-distended; normal bowel sounds, no organomegaly  EXTREMITIES:  2+ peripheral pulses b/l, No clubbing, cyanosis, or edema  NEUROLOGY: A&O x 3, no focal deficits  SKIN: No rashes or lesions    LABS:                        11.9   5.23  )-----------( 204      ( 09 Sep 2020 06:55 )             37.8     09-09    137  |  102  |  29<H>  ----------------------------<  99  4.1   |  23  |  1.31<H>    Ca    9.4      09 Sep 2020 06:55  Phos  3.3     09-09  Mg     1.8     09-09    TPro  4.9<L>  /  Alb  3.2<L>  /  TBili  0.5  /  DBili  x   /  AST  14  /  ALT  15  /  AlkPhos  66  09-08                RADIOLOGY & ADDITIONAL TESTS:  Results Reviewed: Y  Imaging Personally Reviewed: Y  Electrocardiogram Personally Reviewed: Y    COORDINATION OF CARE:  Care Discussed with Consultants/Other Providers [Y/N]: Y  Prior or Outpatient Records Reviewed [Y/N]: Y

## 2020-09-09 NOTE — PROGRESS NOTE ADULT - PROBLEM SELECTOR PROBLEM 5
Essential hypertension
Essential hypertension
Advanced care planning/counseling discussion
Essential hypertension
Essential hypertension

## 2020-09-10 LAB
CULTURE RESULTS: SIGNIFICANT CHANGE UP
CULTURE RESULTS: SIGNIFICANT CHANGE UP
SPECIMEN SOURCE: SIGNIFICANT CHANGE UP
SPECIMEN SOURCE: SIGNIFICANT CHANGE UP

## 2020-10-16 ENCOUNTER — NON-APPOINTMENT (OUTPATIENT)
Age: 85
End: 2020-10-16

## 2020-12-23 PROBLEM — N39.0 ACUTE UTI: Status: RESOLVED | Noted: 2020-07-20 | Resolved: 2020-12-23

## 2021-02-12 NOTE — ED ADULT NURSE REASSESSMENT NOTE - NS ED NURSE REASSESS COMMENT FT1
PT received from day RN: Pt resting in stretcher, continues on cardiac monitoring. Pending disposition
No

## 2021-11-11 NOTE — PATIENT PROFILE ADULT. - PROVIDER NOTIFICATION
Patient Specific Counseling (Will Not Stick From Patient To Patient): Patient declined treatment with liquid nitrogen, opted for topical treatment Detail Level: Simple Declines

## 2022-01-20 NOTE — ED PROVIDER NOTE - PROGRESS NOTE DETAILS
Patient would like to speak to you in regards to explaining what her HR will be looking for when they contact you. Please call [phone]   Tanika Ramírez PGY-1: Spoke with patients Tanika Ramírez PGY-1: Spoke with patient's son Justin. Diagnosed with atrial fibrillation, on eliquis. Mass in bladder that is being watched. Sometimes acts up and gets bleeding with urination, so took her off the eliquis. Constantly urinating all night long, incontinent at times, so doesn't sleep well. Follows with a urologist, who prescribed bactrim. Took one dose last night, one this morning. Spoke to son this morning and "mind wasn't right" couldn't sort out medications, was confused. Thought maybe it was the antibiotic, slept all day, not as responsive. Dr. July Seo with [facility] (urologist). Timo: D/W son, Justin, updated of his mothers diagnosis, admission, and answered all his questions. Tanika Ramírez PGY-1: Spoke with hospitalist, patient admitted.

## 2022-09-24 NOTE — ED ADULT TRIAGE NOTE - BP NONINVASIVE DIASTOLIC (MM HG)
- Daughter notes that patient is A&Ox3 at baseline but had episodes of confusion at times, mostly during last hospitalization.   - Etiology may be underlying dementia, delirium, vs drug induced i/s/o chemotherapy vs metabolic causes vs neurological vs malignancy  - CTH stable chronic infarct without acute findings  - New cough and RVP positive for rhino/enteroviral pneumonia is c/w viral pneumonia which may be contributory.   - Also consider CNS involvement in lymphoma with leptomeningeal disease or side effects from the medications     Plan:  - Supportive care of rhino-enteroviral pneumonia  - CXR with loculated effusion minimally increased from 08/23. However, not seen on CXR from 09/2022.   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   - EEG with Mild nonspecific diffuse cerebral dysfunction; No overt asymmetry despite known lesion; There were no epileptiform abnormalities recorded.    - f/u autoimmune encephalitis and paraneoplastic serum panels   - Onc requesting LP; Scheduled for this coming Monday; f/u CSF studies, restart Eliquis following LP 97

## 2025-02-10 NOTE — DISCHARGE NOTE NURSING/CASE MANAGEMENT/SOCIAL WORK - NSDCPETBCESMAN_GEN_ALL_CORE
If you are a smoker, it is important for your health to stop smoking. Please be aware that second hand smoke is also harmful.
soft

## 2025-05-28 NOTE — HISTORY OF PRESENT ILLNESS
[de-identified] : PAtient has had issues with ear clogging that is minor bilaterally and feels like she needs an ear cleaning. She crouch snot have any acute changes in hearing and is not having any dizziness or ringing in the eras. SHe does not have any nasal congestion or runny nose today. She usually comes once a year for an ear cleaning. SHe wears hearing aids bilatearlly but lately since the ears have been clogged with wax they think, she hasn’t been hearing well even with the hearing aids in
1.87